# Patient Record
Sex: MALE | Race: WHITE | Employment: FULL TIME | URBAN - METROPOLITAN AREA
[De-identification: names, ages, dates, MRNs, and addresses within clinical notes are randomized per-mention and may not be internally consistent; named-entity substitution may affect disease eponyms.]

---

## 2019-07-18 ENCOUNTER — HOSPITAL ENCOUNTER (OUTPATIENT)
Facility: CLINIC | Age: 44
Setting detail: OBSERVATION
Discharge: HOME | End: 2019-07-19
Attending: EMERGENCY MEDICINE | Admitting: INTERNAL MEDICINE
Payer: COMMERCIAL

## 2019-07-18 ENCOUNTER — APPOINTMENT (OUTPATIENT)
Dept: CT IMAGING | Facility: CLINIC | Age: 44
End: 2019-07-18
Attending: EMERGENCY MEDICINE
Payer: COMMERCIAL

## 2019-07-18 DIAGNOSIS — N20.0 KIDNEY STONE: Primary | ICD-10-CM

## 2019-07-18 DIAGNOSIS — E87.6 HYPOKALEMIA: ICD-10-CM

## 2019-07-18 LAB
ALBUMIN SERPL-MCNC: 3.9 G/DL (ref 3.4–5)
ALBUMIN UR-MCNC: NEGATIVE MG/DL
ALP SERPL-CCNC: 100 U/L (ref 40–150)
ALT SERPL W P-5'-P-CCNC: 154 U/L (ref 0–70)
ANION GAP SERPL CALCULATED.3IONS-SCNC: 9 MMOL/L (ref 3–14)
APPEARANCE UR: CLEAR
AST SERPL W P-5'-P-CCNC: 61 U/L (ref 0–45)
BASOPHILS # BLD AUTO: 0.1 10E9/L (ref 0–0.2)
BASOPHILS NFR BLD AUTO: 0.8 %
BILIRUB DIRECT SERPL-MCNC: 0.2 MG/DL (ref 0–0.2)
BILIRUB SERPL-MCNC: 0.5 MG/DL (ref 0.2–1.3)
BILIRUB UR QL STRIP: NEGATIVE
BUN SERPL-MCNC: 16 MG/DL (ref 7–30)
CALCIUM SERPL-MCNC: 9.3 MG/DL (ref 8.5–10.1)
CHLORIDE SERPL-SCNC: 104 MMOL/L (ref 94–109)
CO2 SERPL-SCNC: 26 MMOL/L (ref 20–32)
COLOR UR AUTO: ABNORMAL
CREAT SERPL-MCNC: 1.14 MG/DL (ref 0.66–1.25)
DIFFERENTIAL METHOD BLD: ABNORMAL
EOSINOPHIL # BLD AUTO: 0.1 10E9/L (ref 0–0.7)
EOSINOPHIL NFR BLD AUTO: 0.8 %
ERYTHROCYTE [DISTWIDTH] IN BLOOD BY AUTOMATED COUNT: 11.6 % (ref 10–15)
GFR SERPL CREATININE-BSD FRML MDRD: 78 ML/MIN/{1.73_M2}
GLUCOSE SERPL-MCNC: 190 MG/DL (ref 70–99)
GLUCOSE UR STRIP-MCNC: 100 MG/DL
HBA1C MFR BLD: 6.1 % (ref 0–5.6)
HCT VFR BLD AUTO: 45.7 % (ref 40–53)
HGB BLD-MCNC: 16.5 G/DL (ref 13.3–17.7)
HGB UR QL STRIP: NEGATIVE
IMM GRANULOCYTES # BLD: 0 10E9/L (ref 0–0.4)
IMM GRANULOCYTES NFR BLD: 0.5 %
INTERPRETATION ECG - MUSE: NORMAL
KETONES UR STRIP-MCNC: ABNORMAL MG/DL
LEUKOCYTE ESTERASE UR QL STRIP: NEGATIVE
LIPASE SERPL-CCNC: 155 U/L (ref 73–393)
LYMPHOCYTES # BLD AUTO: 1.9 10E9/L (ref 0.8–5.3)
LYMPHOCYTES NFR BLD AUTO: 23.9 %
MAGNESIUM SERPL-MCNC: 2 MG/DL (ref 1.6–2.3)
MCH RBC QN AUTO: 33.8 PG (ref 26.5–33)
MCHC RBC AUTO-ENTMCNC: 36.1 G/DL (ref 31.5–36.5)
MCV RBC AUTO: 94 FL (ref 78–100)
MONOCYTES # BLD AUTO: 0.6 10E9/L (ref 0–1.3)
MONOCYTES NFR BLD AUTO: 7.1 %
MUCOUS THREADS #/AREA URNS LPF: PRESENT /LPF
NEUTROPHILS # BLD AUTO: 5.2 10E9/L (ref 1.6–8.3)
NEUTROPHILS NFR BLD AUTO: 66.9 %
NITRATE UR QL: NEGATIVE
NRBC # BLD AUTO: 0 10*3/UL
NRBC BLD AUTO-RTO: 0 /100
PH UR STRIP: 8 PH (ref 5–7)
PLATELET # BLD AUTO: 231 10E9/L (ref 150–450)
POTASSIUM SERPL-SCNC: 2.5 MMOL/L (ref 3.4–5.3)
POTASSIUM SERPL-SCNC: 4.2 MMOL/L (ref 3.4–5.3)
PROT SERPL-MCNC: 6.9 G/DL (ref 6.8–8.8)
RBC # BLD AUTO: 4.88 10E12/L (ref 4.4–5.9)
RBC #/AREA URNS AUTO: 5 /HPF (ref 0–2)
SODIUM SERPL-SCNC: 139 MMOL/L (ref 133–144)
SOURCE: ABNORMAL
SP GR UR STRIP: 1.01 (ref 1–1.03)
UROBILINOGEN UR STRIP-MCNC: NORMAL MG/DL (ref 0–2)
WBC # BLD AUTO: 7.7 10E9/L (ref 4–11)
WBC #/AREA URNS AUTO: <1 /HPF (ref 0–5)

## 2019-07-18 PROCEDURE — 83690 ASSAY OF LIPASE: CPT | Performed by: PHYSICIAN ASSISTANT

## 2019-07-18 PROCEDURE — 83036 HEMOGLOBIN GLYCOSYLATED A1C: CPT | Performed by: PHYSICIAN ASSISTANT

## 2019-07-18 PROCEDURE — 85025 COMPLETE CBC W/AUTO DIFF WBC: CPT | Performed by: EMERGENCY MEDICINE

## 2019-07-18 PROCEDURE — 99220 ZZC INITIAL OBSERVATION CARE,LEVL III: CPT | Performed by: PHYSICIAN ASSISTANT

## 2019-07-18 PROCEDURE — G0378 HOSPITAL OBSERVATION PER HR: HCPCS

## 2019-07-18 PROCEDURE — 25000132 ZZH RX MED GY IP 250 OP 250 PS 637: Performed by: EMERGENCY MEDICINE

## 2019-07-18 PROCEDURE — 80048 BASIC METABOLIC PNL TOTAL CA: CPT | Performed by: EMERGENCY MEDICINE

## 2019-07-18 PROCEDURE — 74176 CT ABD & PELVIS W/O CONTRAST: CPT

## 2019-07-18 PROCEDURE — 96375 TX/PRO/DX INJ NEW DRUG ADDON: CPT

## 2019-07-18 PROCEDURE — 81001 URINALYSIS AUTO W/SCOPE: CPT | Performed by: EMERGENCY MEDICINE

## 2019-07-18 PROCEDURE — 96361 HYDRATE IV INFUSION ADD-ON: CPT

## 2019-07-18 PROCEDURE — 80076 HEPATIC FUNCTION PANEL: CPT | Performed by: PHYSICIAN ASSISTANT

## 2019-07-18 PROCEDURE — 96376 TX/PRO/DX INJ SAME DRUG ADON: CPT

## 2019-07-18 PROCEDURE — 36415 COLL VENOUS BLD VENIPUNCTURE: CPT | Performed by: PHYSICIAN ASSISTANT

## 2019-07-18 PROCEDURE — 99285 EMERGENCY DEPT VISIT HI MDM: CPT | Mod: 25

## 2019-07-18 PROCEDURE — 96365 THER/PROPH/DIAG IV INF INIT: CPT

## 2019-07-18 PROCEDURE — 83735 ASSAY OF MAGNESIUM: CPT | Performed by: EMERGENCY MEDICINE

## 2019-07-18 PROCEDURE — 25000132 ZZH RX MED GY IP 250 OP 250 PS 637: Performed by: PHYSICIAN ASSISTANT

## 2019-07-18 PROCEDURE — 25000128 H RX IP 250 OP 636: Performed by: PHYSICIAN ASSISTANT

## 2019-07-18 PROCEDURE — 25000128 H RX IP 250 OP 636: Performed by: EMERGENCY MEDICINE

## 2019-07-18 PROCEDURE — 96366 THER/PROPH/DIAG IV INF ADDON: CPT

## 2019-07-18 PROCEDURE — 84132 ASSAY OF SERUM POTASSIUM: CPT | Performed by: PHYSICIAN ASSISTANT

## 2019-07-18 PROCEDURE — 25800030 ZZH RX IP 258 OP 636: Performed by: PHYSICIAN ASSISTANT

## 2019-07-18 RX ORDER — ACETAMINOPHEN 650 MG/1
650 SUPPOSITORY RECTAL EVERY 4 HOURS PRN
Status: DISCONTINUED | OUTPATIENT
Start: 2019-07-18 | End: 2019-07-19 | Stop reason: HOSPADM

## 2019-07-18 RX ORDER — CETIRIZINE HYDROCHLORIDE 10 MG/1
10 TABLET ORAL 2 TIMES DAILY
Status: DISCONTINUED | OUTPATIENT
Start: 2019-07-18 | End: 2019-07-19 | Stop reason: HOSPADM

## 2019-07-18 RX ORDER — ACETAMINOPHEN 325 MG/1
650 TABLET ORAL EVERY 4 HOURS PRN
Status: DISCONTINUED | OUTPATIENT
Start: 2019-07-18 | End: 2019-07-19 | Stop reason: HOSPADM

## 2019-07-18 RX ORDER — LEVOTHYROXINE SODIUM 100 UG/1
100 TABLET ORAL DAILY
Status: DISCONTINUED | OUTPATIENT
Start: 2019-07-18 | End: 2019-07-19 | Stop reason: HOSPADM

## 2019-07-18 RX ORDER — POTASSIUM CL/LIDO/0.9 % NACL 10MEQ/0.1L
10 INTRAVENOUS SOLUTION, PIGGYBACK (ML) INTRAVENOUS
Status: DISCONTINUED | OUTPATIENT
Start: 2019-07-18 | End: 2019-07-19 | Stop reason: HOSPADM

## 2019-07-18 RX ORDER — HYDROMORPHONE HYDROCHLORIDE 1 MG/ML
0.5 INJECTION, SOLUTION INTRAMUSCULAR; INTRAVENOUS; SUBCUTANEOUS
Status: DISCONTINUED | OUTPATIENT
Start: 2019-07-18 | End: 2019-07-18

## 2019-07-18 RX ORDER — MAGNESIUM SULFATE HEPTAHYDRATE 40 MG/ML
4 INJECTION, SOLUTION INTRAVENOUS EVERY 4 HOURS PRN
Status: DISCONTINUED | OUTPATIENT
Start: 2019-07-18 | End: 2019-07-19 | Stop reason: HOSPADM

## 2019-07-18 RX ORDER — FLUTICASONE PROPIONATE 50 MCG
2 SPRAY, SUSPENSION (ML) NASAL DAILY PRN
COMMUNITY

## 2019-07-18 RX ORDER — POTASSIUM CHLORIDE 1500 MG/1
20-40 TABLET, EXTENDED RELEASE ORAL
Status: DISCONTINUED | OUTPATIENT
Start: 2019-07-18 | End: 2019-07-19 | Stop reason: HOSPADM

## 2019-07-18 RX ORDER — POTASSIUM CHLORIDE 1.5 G/1.58G
20 POWDER, FOR SOLUTION ORAL ONCE
Status: COMPLETED | OUTPATIENT
Start: 2019-07-18 | End: 2019-07-18

## 2019-07-18 RX ORDER — KETOROLAC TROMETHAMINE 15 MG/ML
15 INJECTION, SOLUTION INTRAMUSCULAR; INTRAVENOUS ONCE
Status: COMPLETED | OUTPATIENT
Start: 2019-07-18 | End: 2019-07-18

## 2019-07-18 RX ORDER — PROCHLORPERAZINE MALEATE 10 MG
10 TABLET ORAL EVERY 6 HOURS PRN
Status: DISCONTINUED | OUTPATIENT
Start: 2019-07-18 | End: 2019-07-19 | Stop reason: HOSPADM

## 2019-07-18 RX ORDER — MORPHINE SULFATE 2 MG/ML
2 INJECTION, SOLUTION INTRAMUSCULAR; INTRAVENOUS ONCE
Status: COMPLETED | OUTPATIENT
Start: 2019-07-18 | End: 2019-07-18

## 2019-07-18 RX ORDER — POTASSIUM CL/LIDO/0.9 % NACL 10MEQ/0.1L
10 INTRAVENOUS SOLUTION, PIGGYBACK (ML) INTRAVENOUS
Status: DISCONTINUED | OUTPATIENT
Start: 2019-07-18 | End: 2019-07-18

## 2019-07-18 RX ORDER — MORPHINE SULFATE 4 MG/ML
4 INJECTION, SOLUTION INTRAMUSCULAR; INTRAVENOUS ONCE
Status: DISCONTINUED | OUTPATIENT
Start: 2019-07-18 | End: 2019-07-18

## 2019-07-18 RX ORDER — POTASSIUM CHLORIDE 29.8 MG/ML
20 INJECTION INTRAVENOUS
Status: DISCONTINUED | OUTPATIENT
Start: 2019-07-18 | End: 2019-07-19 | Stop reason: HOSPADM

## 2019-07-18 RX ORDER — MORPHINE SULFATE 4 MG/ML
2 INJECTION, SOLUTION INTRAMUSCULAR; INTRAVENOUS ONCE
Status: COMPLETED | OUTPATIENT
Start: 2019-07-18 | End: 2019-07-18

## 2019-07-18 RX ORDER — POTASSIUM CHLORIDE 7.45 MG/ML
10 INJECTION INTRAVENOUS
Status: DISCONTINUED | OUTPATIENT
Start: 2019-07-18 | End: 2019-07-19 | Stop reason: HOSPADM

## 2019-07-18 RX ORDER — PROCHLORPERAZINE 25 MG
25 SUPPOSITORY, RECTAL RECTAL EVERY 12 HOURS PRN
Status: DISCONTINUED | OUTPATIENT
Start: 2019-07-18 | End: 2019-07-19 | Stop reason: HOSPADM

## 2019-07-18 RX ORDER — OXYCODONE HYDROCHLORIDE 5 MG/1
5-10 TABLET ORAL
Status: DISCONTINUED | OUTPATIENT
Start: 2019-07-18 | End: 2019-07-19 | Stop reason: HOSPADM

## 2019-07-18 RX ORDER — MORPHINE SULFATE 2 MG/ML
2-4 INJECTION, SOLUTION INTRAMUSCULAR; INTRAVENOUS
Status: DISCONTINUED | OUTPATIENT
Start: 2019-07-18 | End: 2019-07-19 | Stop reason: HOSPADM

## 2019-07-18 RX ORDER — TAMSULOSIN HYDROCHLORIDE 0.4 MG/1
0.4 CAPSULE ORAL DAILY
Status: DISCONTINUED | OUTPATIENT
Start: 2019-07-18 | End: 2019-07-19 | Stop reason: HOSPADM

## 2019-07-18 RX ORDER — HYDROMORPHONE HYDROCHLORIDE 1 MG/ML
0.3 INJECTION, SOLUTION INTRAMUSCULAR; INTRAVENOUS; SUBCUTANEOUS
Status: DISCONTINUED | OUTPATIENT
Start: 2019-07-18 | End: 2019-07-18

## 2019-07-18 RX ORDER — AZELASTINE HYDROCHLORIDE 137 UG/1
1 SPRAY, METERED NASAL 2 TIMES DAILY
COMMUNITY

## 2019-07-18 RX ORDER — ONDANSETRON 2 MG/ML
4 INJECTION INTRAMUSCULAR; INTRAVENOUS EVERY 6 HOURS PRN
Status: DISCONTINUED | OUTPATIENT
Start: 2019-07-18 | End: 2019-07-19 | Stop reason: HOSPADM

## 2019-07-18 RX ORDER — AMOXICILLIN 250 MG
2 CAPSULE ORAL 2 TIMES DAILY PRN
Status: DISCONTINUED | OUTPATIENT
Start: 2019-07-18 | End: 2019-07-19 | Stop reason: HOSPADM

## 2019-07-18 RX ORDER — SODIUM CHLORIDE 9 MG/ML
1000 INJECTION, SOLUTION INTRAVENOUS CONTINUOUS
Status: DISCONTINUED | OUTPATIENT
Start: 2019-07-18 | End: 2019-07-18

## 2019-07-18 RX ORDER — DEXTROAMPHETAMINE SACCHARATE, AMPHETAMINE ASPARTATE, DEXTROAMPHETAMINE SULFATE AND AMPHETAMINE SULFATE 2.5; 2.5; 2.5; 2.5 MG/1; MG/1; MG/1; MG/1
20 TABLET ORAL 2 TIMES DAILY
Status: DISCONTINUED | OUTPATIENT
Start: 2019-07-18 | End: 2019-07-19 | Stop reason: HOSPADM

## 2019-07-18 RX ORDER — POTASSIUM CHLORIDE 1.5 G/1.58G
20-40 POWDER, FOR SOLUTION ORAL
Status: DISCONTINUED | OUTPATIENT
Start: 2019-07-18 | End: 2019-07-19 | Stop reason: HOSPADM

## 2019-07-18 RX ORDER — LEVOTHYROXINE SODIUM 100 UG/1
100 TABLET ORAL DAILY
COMMUNITY

## 2019-07-18 RX ORDER — AMOXICILLIN 250 MG
1 CAPSULE ORAL 2 TIMES DAILY PRN
Status: DISCONTINUED | OUTPATIENT
Start: 2019-07-18 | End: 2019-07-19 | Stop reason: HOSPADM

## 2019-07-18 RX ORDER — DEXTROAMPHETAMINE SACCHARATE, AMPHETAMINE ASPARTATE, DEXTROAMPHETAMINE SULFATE AND AMPHETAMINE SULFATE 5; 5; 5; 5 MG/1; MG/1; MG/1; MG/1
20 TABLET ORAL 2 TIMES DAILY
COMMUNITY

## 2019-07-18 RX ORDER — LIDOCAINE 40 MG/G
CREAM TOPICAL
Status: DISCONTINUED | OUTPATIENT
Start: 2019-07-18 | End: 2019-07-19 | Stop reason: HOSPADM

## 2019-07-18 RX ORDER — NALOXONE HYDROCHLORIDE 0.4 MG/ML
.1-.4 INJECTION, SOLUTION INTRAMUSCULAR; INTRAVENOUS; SUBCUTANEOUS
Status: DISCONTINUED | OUTPATIENT
Start: 2019-07-18 | End: 2019-07-19 | Stop reason: HOSPADM

## 2019-07-18 RX ORDER — SODIUM CHLORIDE, SODIUM LACTATE, POTASSIUM CHLORIDE, CALCIUM CHLORIDE 600; 310; 30; 20 MG/100ML; MG/100ML; MG/100ML; MG/100ML
INJECTION, SOLUTION INTRAVENOUS CONTINUOUS
Status: DISCONTINUED | OUTPATIENT
Start: 2019-07-18 | End: 2019-07-19 | Stop reason: HOSPADM

## 2019-07-18 RX ORDER — ONDANSETRON 2 MG/ML
4 INJECTION INTRAMUSCULAR; INTRAVENOUS EVERY 30 MIN PRN
Status: DISCONTINUED | OUTPATIENT
Start: 2019-07-18 | End: 2019-07-18

## 2019-07-18 RX ORDER — CETIRIZINE HYDROCHLORIDE 10 MG/1
10 TABLET ORAL 2 TIMES DAILY
COMMUNITY

## 2019-07-18 RX ORDER — POLYETHYLENE GLYCOL 3350 17 G/17G
17 POWDER, FOR SOLUTION ORAL DAILY PRN
Status: DISCONTINUED | OUTPATIENT
Start: 2019-07-18 | End: 2019-07-19 | Stop reason: HOSPADM

## 2019-07-18 RX ORDER — ONDANSETRON 4 MG/1
4 TABLET, ORALLY DISINTEGRATING ORAL EVERY 6 HOURS PRN
Status: DISCONTINUED | OUTPATIENT
Start: 2019-07-18 | End: 2019-07-19 | Stop reason: HOSPADM

## 2019-07-18 RX ORDER — DEXTROAMPHETAMINE SACCHARATE, AMPHETAMINE ASPARTATE, DEXTROAMPHETAMINE SULFATE AND AMPHETAMINE SULFATE 5; 5; 5; 5 MG/1; MG/1; MG/1; MG/1
20 TABLET ORAL 2 TIMES DAILY
Status: DISCONTINUED | OUTPATIENT
Start: 2019-07-18 | End: 2019-07-18

## 2019-07-18 RX ORDER — CALCIUM CARBONATE 500 MG/1
2 TABLET, CHEWABLE ORAL AT BEDTIME
COMMUNITY

## 2019-07-18 RX ADMIN — MORPHINE SULFATE 2 MG: 4 INJECTION INTRAVENOUS at 13:03

## 2019-07-18 RX ADMIN — LEVOTHYROXINE SODIUM 100 MCG: 100 TABLET ORAL at 14:49

## 2019-07-18 RX ADMIN — ONDANSETRON HYDROCHLORIDE 4 MG: 2 INJECTION, SOLUTION INTRAMUSCULAR; INTRAVENOUS at 10:57

## 2019-07-18 RX ADMIN — Medication 10 MEQ: at 09:02

## 2019-07-18 RX ADMIN — MORPHINE SULFATE 2 MG: 2 INJECTION, SOLUTION INTRAMUSCULAR; INTRAVENOUS at 10:56

## 2019-07-18 RX ADMIN — KETOROLAC TROMETHAMINE 15 MG: 15 INJECTION, SOLUTION INTRAMUSCULAR; INTRAVENOUS at 09:10

## 2019-07-18 RX ADMIN — HYDROMORPHONE HYDROCHLORIDE 0.5 MG: 1 INJECTION, SOLUTION INTRAMUSCULAR; INTRAVENOUS; SUBCUTANEOUS at 07:23

## 2019-07-18 RX ADMIN — POTASSIUM CHLORIDE 20 MEQ: 1.5 POWDER, FOR SOLUTION ORAL at 09:10

## 2019-07-18 RX ADMIN — ONDANSETRON HYDROCHLORIDE 4 MG: 2 INJECTION, SOLUTION INTRAMUSCULAR; INTRAVENOUS at 07:24

## 2019-07-18 RX ADMIN — MORPHINE SULFATE 2 MG: 2 INJECTION, SOLUTION INTRAMUSCULAR; INTRAVENOUS at 17:48

## 2019-07-18 RX ADMIN — MORPHINE SULFATE 2 MG: 2 INJECTION, SOLUTION INTRAMUSCULAR; INTRAVENOUS at 20:02

## 2019-07-18 RX ADMIN — CETIRIZINE HYDROCHLORIDE 10 MG: 10 TABLET, FILM COATED ORAL at 20:02

## 2019-07-18 RX ADMIN — OMEPRAZOLE 20 MG: 20 CAPSULE, DELAYED RELEASE ORAL at 14:49

## 2019-07-18 RX ADMIN — SODIUM CHLORIDE, POTASSIUM CHLORIDE, SODIUM LACTATE AND CALCIUM CHLORIDE: 600; 310; 30; 20 INJECTION, SOLUTION INTRAVENOUS at 14:18

## 2019-07-18 RX ADMIN — MORPHINE SULFATE 2 MG: 4 INJECTION INTRAVENOUS at 13:32

## 2019-07-18 RX ADMIN — PROCHLORPERAZINE EDISYLATE 10 MG: 5 INJECTION INTRAMUSCULAR; INTRAVENOUS at 14:18

## 2019-07-18 RX ADMIN — TAMSULOSIN HYDROCHLORIDE 0.4 MG: 0.4 CAPSULE ORAL at 14:49

## 2019-07-18 RX ADMIN — SODIUM CHLORIDE 1000 ML: 9 INJECTION, SOLUTION INTRAVENOUS at 07:27

## 2019-07-18 ASSESSMENT — ENCOUNTER SYMPTOMS
FLANK PAIN: 1
NAUSEA: 1
FREQUENCY: 1
VOMITING: 1

## 2019-07-18 NOTE — PHARMACY-ADMISSION MEDICATION HISTORY
"Admission medication history interview status for this patient is complete. See [facility] admission navigator for allergy information, prior to admission medications and immunization status.     Medication history interview source(s):Patient  Medication history resources (including written lists, pill bottles, clinic record): care everywhere record  Primary pharmacy: [facility] in [facility]    Changes made to PTA medication list:  Added: zyrtec, flonase, tums, omeprazole, adderall, azelastine, levothyroxine   Deleted: claritin, \"blood pressure medication\"  Changed: none    Actions taken by pharmacist (provider contacted, etc):None     Additional medication history information:None    Medication reconciliation/reorder completed by provider prior to medication history? No    Do you take OTC medications (eg tylenol, ibuprofen, fish oil, eye/ear drops, etc)? Y (Y/N)    For patients on insulin therapy: N (Y/N)    Prior to Admission medications    Medication Sig Last Dose Taking? Auth Provider   amphetamine-dextroamphetamine (ADDERALL) 20 MG tablet Take 20 mg by mouth 2 times daily 7/17/2019 at am Yes Unknown, Entered By History   Azelastine HCl 137 MCG/SPRAY SOLN Spray 1 spray into both nostrils 2 times daily 7/17/2019 at pm Yes Unknown, Entered By History   calcium carbonate (TUMS) 500 MG chewable tablet Take 2 chew tab by mouth At Bedtime 7/17/2019 at pm Yes Unknown, Entered By History   cetirizine (ZYRTEC) 10 MG tablet Take 10 mg by mouth 2 times daily 7/17/2019 at pm Yes Unknown, Entered By History   fluticasone (FLONASE) 50 MCG/ACT nasal spray Spray 2 sprays into both nostrils daily as needed for rhinitis or allergies 7/17/2019 at am Yes Unknown, Entered By History   levothyroxine (SYNTHROID/LEVOTHROID) 100 MCG tablet Take 100 mcg by mouth daily 7/17/2019 at Unknown time Yes Unknown, Entered By History   omeprazole (PRILOSEC) 20 MG DR capsule Take 20 mg by mouth daily 7/17/2019 at am Yes Unknown, Entered By History "

## 2019-07-18 NOTE — ED NOTES
Patient oxygen saturations dropped to 40's after administration of Dilaudid. Oxygen applied via nasal cannula at 5 litters. Oxygen saturations angi to mid 90's. Patient very sleepy but will answer questions and fall back asleep. MD made aware and went to bedside. No further orders, will continue to monitor.

## 2019-07-18 NOTE — ED PROVIDER NOTES
History     Chief Complaint:  Flank Pain      HPI   Raj Cheema is a 43 year old male with a history of hypertension who presents to the ED for evaluation of flank pain. The patient reports that he was woken from sleep this morning at 0400 by the sudden onset of left flank pain radiating to his testicles. This was associated with nausea, vomiting, and urinary frequency. His wife decided to drive him to the ED secondary to the severity of his discomfort, and here he endorses continued pain. The patient denies any personal history of kidney stones, although he notes that his daughter recently had one removed.     Allergies:  NKDA    Medications:    Claritin  Azelastine  Adderall  Levothyroxine  Omeprazole  Flonase    Past Medical History:    Arthritis  CORRINE  Wright's esophagus  ADHD  Hypothyroidism  Allergic rhinitis  Hypertension   GI problem    Past Surgical History:    Knee meniscus repair  Knee arthrocentesis    Family History:    Diabetes  Allergies  Hypertension  Hyperlipidemia  Thyroid disease  Eye disorder    Social History:  Negative for tobacco use.  Alcohol Use: Yes   Marital Status:   [2]     Review of Systems   Gastrointestinal: Positive for nausea and vomiting.   Genitourinary: Positive for flank pain and frequency.   All other systems reviewed and are negative.    Physical Exam     Patient Vitals for the past 24 hrs:   BP Temp Pulse Heart Rate Resp SpO2   07/18/19 1300 -- -- 82 -- -- --   07/18/19 1245 -- -- -- -- -- 99 %   07/18/19 1230 (!) 152/108 -- 67 -- -- 99 %   07/18/19 1215 (!) 146/99 -- 69 -- -- 97 %   07/18/19 1200 (!) 135/96 -- 76 -- -- 94 %   07/18/19 1145 (!) 149/98 -- 71 -- -- 96 %   07/18/19 1130 (!) 143/98 -- 73 -- -- 95 %   07/18/19 1120 (!) 144/97 -- -- -- -- 96 %   07/18/19 1115 (!) 144/97 -- 73 -- -- 95 %   07/18/19 1100 132/82 -- 76 -- -- 98 %   07/18/19 1015 (!) 144/95 -- 69 -- -- 98 %   07/18/19 1000 (!) 142/101 -- 74 -- -- 98 %   07/18/19 0945 (!) 147/95 -- 68 -- --  98 %   07/18/19 0930 147/90 -- 68 -- -- 97 %   07/18/19 0915 (!) 146/96 -- 69 -- -- 98 %   07/18/19 0900 (!) 138/94 -- 67 -- -- --   07/18/19 0815 -- -- -- -- -- 99 %   07/18/19 0800 (!) 134/98 -- 74 -- -- 100 %   07/18/19 0759 -- 98.8  F (37.1  C) -- -- -- --   07/18/19 0745 (!) 145/96 -- 64 -- -- 100 %   07/18/19 0731 -- -- -- -- -- (!) 48 %   07/18/19 0730 138/89 -- -- -- -- (!) 80 %   07/18/19 0707 115/81 -- 87 87 18 99 %        Physical Exam  General: Uncomfortable appearing, moaning   Head:  The scalp, face, and head appear normal  Eyes:  Conjunctivae are normal  ENT:    The nose is normal    Pinnae are normal    External acoustic canals are normal  Neck:  Trachea midline  CV:  Pulses are normal.    Resp:  No respiratory distress   Abdomen:      Soft, left flank and left anterior abdominal tenderness, non-distended  Musc:  Normal muscular tone    No major joint effusions    No asymmetric leg swelling  Skin:  No rash or lesions noted  Neuro:  Speech is normal and fluent. Face is symmetric.     Moving all extremities well.   Psych: Awake. Alert.  Normal affect.  Appropriate interactions.    Emergency Department Course   ECG:  Indication: Hypokalemia  Time: 0759  Vent. Rate 66 bpm. AZ interval 138. QRS duration 92. QT/QTc 440/461. P-R-T axis 26 34 135.  Normal sinus rhythm. T wave abnormality, consider lateral ischemia. Prolonged QT. Abnormal ECG. Read time: 0759     Imaging:  Radiographic findings were communicated with the patient who voiced understanding of the findings.   CT Abdomen/Pelvis w/o IV contrast-stone protocol:   1. Left hydronephrosis due to 0.2 cm distal left ureter stone at the  level of the acetabulum approximately 1.5 cm from the ureterovesical  junction.  2. Tiny intrarenal nonobstructing calculi, 0.3 cm or less.  3. fatty liver.  4. There is lobulation of the contour of the pancreas, anterior  fullness at the proximal pancreatic tail of uncertain significance. If  the patient has any outside  prior exam, comparison is recommended to  see whether this is stable. If no comparison exam to document  stability, consider follow-up or further evaluation with dedicated  pancreas protocol CT with IV contrast, as per radiology.      Laboratory:  CBC: WBC: 7.7, HGB: 16.5, PLT: 231  BMP: Glucose 190 (H), K 2.5 (LL), o/w WNL (Creatinine: 1.14)    Magnesium: 2.0    UA with Microscopic:  (A), Ketones trace (A), pH 8.0 (H), RBC 5 (H), Mucous present (A), o/w WNL     Interventions:  0723 Dilaudid, 0.5 mg, IV injection   0724 Zofran, 4 mg, IV injection  0727 NS 1L IV   0902 Potassium Chloride 10 mEq IV infusion  0910 Klor-Con 20 mEq PO   Toradol 15 mg IV  1056 Morphine 2 mg IV  1057 Zofran, 4 mg, IV injection   1303 Morphine 2 mg IV  Please see MAR for full list of medications administered in the ED.      Emergency Department Course:  Nursing notes and vitals reviewed. (9510) I performed an exam of the patient as documented above.     IV inserted. Medicine administered as documented above. Blood drawn. This was sent to the lab for further testing, results above.    (0400) Nursing staff informed me that the patient's SPO2 had dropped to 40% following administration of 0.5 mg Dilaudid. He was rousable to sternal rub and placed on supplemental O2. Patient's wife confirmed that he does have a history of sleep apnea.    (0341) I was informed that the patient's potassium was critically low at 2.5.     EKG obtained in the ED, see results above.      The patient was sent for an abdominal CT while in the emergency department, findings above.     The patient provided a urine sample here in the emergency department. This was sent for laboratory testing, findings above.     (1020) I rechecked the patient and discussed the results of his workup thus far.     (1112)  I consulted with Bee Kim PA-C of the hospitalist services. They are in agreement to accept the patient for admission to Dr. Dimas.    Findings and plan  explained to the Patient who consents to admission. Discussed the patient with Bee Kim for Dr. Dimas, who will admit the patient to an observation bed for further monitoring, evaluation, and treatment.    Impression & Plan    Medical Decision Making:  Raj Cheema is a 43 year old male who presents with abdominal pain. When I arrived to the room he was quite uncomfortable and moaning in pain. He admitted to several episodes of vomiting and I was suspicious for kidney stone. Urinalysis was unremarkable. Blood work and pain medication, as well as Zofran, were given. He had a mildly prolonged QT of 460, however this really did not appear too prolonged. Interestingly, his potassium was 2.5, likely secondary to his vomiting. Patient was given IV and oral potassium once his nausea was under control. CT revealed a small stone in the distal left ureter. It is only 0.2 cm so likely will pass on its own. Patient was given IV fluids. With his significant hypokalemia and continued pain, I discussed admission and patient was agreeable to this. I doubt he will need stent placement given the small size of the stone. Patient admitted to observation in stable condition.     Diagnosis:    ICD-10-CM    1. Hypokalemia E87.6        Disposition:  Admitted to Dr. Dimas      Scribe Disclosure:  I, Ann Vick, am serving as a scribe on 7/18/2019 at 7:10 AM to personally document services performed by Inga Low MD based on my observations and the provider's statements to me.      Ann Vick  7/18/2019   [facility] EMERGENCY DEPARTMENT       Inga Low MD  07/18/19 8074

## 2019-07-18 NOTE — ED NOTES
[facility]  ED Nurse Handoff Report    Raj Cheema is a 43 year old male   ED Chief complaint: Flank Pain  . ED Diagnosis:   Final diagnoses:   Hypokalemia     Allergies: No Known Allergies     Code Status: Not on file  Activity level - Baseline/Home:  Independent. Activity Level - Current:   Stand by Assist. Lift room needed: No. Bariatric: No   Needed: No   Isolation: No. Infection: Not Applicable.     Vital Signs:   Vitals:    07/18/19 1215 07/18/19 1230 07/18/19 1245 07/18/19 1300   BP: (!) 146/99 (!) 152/108     Pulse: 69 67  82   Resp:       Temp:       SpO2: 97% 99% 99%        Cardiac Rhythm:  ,      Pain level: 0-10 Pain Scale: 5  Patient confused: No. Patient Falls Risk: Yes.   Elimination Status: Has voided   Patient Report - Initial Complaint: Flank and groin pain. Focused Assessment:Raj Cheema is a 43 year old male with a history of hypertension who presents to the ED for evaluation of flank pain. The patient reports that he was woken from sleep this morning at 0400 by the sudden onset of left flank pain radiating to his testicles. This was associated with nausea, vomiting, and urinary frequency. His wife decided to drive him to the ED secondary to the severity of his discomfort, and here he endorses continued pain. The patient denies any personal history of kidney stones, although he notes that his daughter recently had one removed.      Genitourinary Genitourinary - Genitourinary WDL: -WDL except; general symptoms   Genitourinary -  Signs and Symptoms: flank pain (left sided pain, pain goes down into groin area. Denies any blood in urine, patient states he his having urine frequency)        Tests Performed: Blood Work, CT abdomen . Abnormal Results:   Labs Ordered and Resulted from Time of ED Arrival Up to the Time of Departure from the ED   CBC WITH PLATELETS DIFFERENTIAL - Abnormal; Notable for the following components:       Result Value    MCH 33.8 (*)     All  other components within normal limits   BASIC METABOLIC PANEL - Abnormal; Notable for the following components:    Potassium 2.5 (*)     Glucose 190 (*)     All other components within normal limits   ROUTINE UA WITH MICROSCOPIC - Abnormal; Notable for the following components:    Glucose Urine 100 (*)     Ketones Urine Trace (*)     pH Urine 8.0 (*)     RBC Urine 5 (*)     Mucous Urine Present (*)     All other components within normal limits   MAGNESIUM     Abd/pelvis CT no contrast - Stone Protocol   Preliminary Result   IMPRESSION:    1. Left hydronephrosis due to 0.2 cm distal left ureter stone at the   level of the acetabulum approximately 1.5 cm from the ureterovesical   junction.   2. Tiny intrarenal nonobstructing calculi, 0.3 cm or less.   3. fatty liver.   4. There is lobulation of the contour of the pancreas, anterior   fullness at the proximal pancreatic tail of uncertain significance. If   the patient has any outside prior exam, comparison is recommended to   see whether this is stable. If no comparison exam to document   stability, consider follow-up or further evaluation with dedicated   pancreas protocol CT with IV contrast.          Treatments provided:Pain medication   Family Comments: Wife in room  OBS brochure/video discussed/provided to patient:  Yes  ED Medications:   Medications   0.9% sodium chloride BOLUS (0 mLs Intravenous Stopped 7/18/19 0901)     Followed by   sodium chloride 0.9% infusion (has no administration in time range)   HYDROmorphone (PF) (DILAUDID) injection 0.5 mg (0.5 mg Intravenous Given 7/18/19 0723)   ondansetron (ZOFRAN) injection 4 mg (4 mg Intravenous Given 7/18/19 1057)   potassium chloride 10 mEq in 100 mL intermittent infusion with 10 mg lidocaine (0 mEq Intravenous Stopped 7/18/19 1127)   potassium chloride (KLOR-CON) Packet 20 mEq (20 mEq Oral Given 7/18/19 0910)   ketorolac (TORADOL) injection 15 mg (15 mg Intravenous Given 7/18/19 0910)   morphine (PF) injection  2 mg (2 mg Intravenous Given 7/18/19 1056)   morphine (PF) injection 2 mg (2 mg Intravenous Given 7/18/19 1303)     Drips infusing:  No  For the majority of the shift, the patient's behavior Green. Interventions performed were NA.     Severe Sepsis OR Septic Shock Diagnosis Present: No      ED Nurse Name/Phone Number: Marla Stanton,   10:45 AM  RECEIVING UNIT ED HANDOFF REVIEW    Above ED Nurse Handoff Report was reviewed: Yes  Reviewed by: iGna Finn on July 18, 2019 at 1:13 PM

## 2019-07-18 NOTE — PLAN OF CARE
PRIMARY DIAGNOSIS: ACUTE RENAL COLIC/hypokalemia    OUTPATIENT/OBSERVATION GOALS TO BE MET BEFORE DISCHARGE  1. Pain Status: Improved but still requiring IV narcotics.    2. Tolerating adequate PO diet: Nauseous, compazine given.      3. Surgical Intervention planned: No    4. Cleared by consultants (if involved): N/A    5. Return to near baseline physical activity: No, SBA for safety     Discharge Planner Nurse   Safe discharge environment identified: Yes  Barriers to discharge: No       Entered by: Gina Finn 07/18/2019 3:08 PM     Please review provider order for any additional goals.   Nurse to notify provider when observation goals have been met and patient is ready for discharge.    VSS except BP elevated.  IV compazine x1 for c/o nausea. LR infusing @ 150mlh.  SBA due to narcotics.  IV morphine available for pain. Straining urine, pt started on flomax.

## 2019-07-18 NOTE — ED TRIAGE NOTES
Patient presents with complaints of left sided flank pain which radiates into groin. Patient also having frequency and vomiting as well.  Pain started around 0400.  Denies any history of kidney stones. ABC intact without need for intervention at this time.

## 2019-07-18 NOTE — PLAN OF CARE
ROOM # 230    Living Situation (if not independent, order SW consult):  Facility name:  : [facility]     Activity level at baseline: Independent   Activity level on admit: SBA      Patient registered to observation; given Patient Bill of Rights; given the opportunity to ask questions about observation status and their plan of care.  Patient has been oriented to the observation room, bathroom and call light is in place.    Discussed discharge goals and expectations with patient/family.

## 2019-07-18 NOTE — H&P
History and Physical     Raj Cheema MRN# [id number]   YOB: 1975 Age: 43 year old      Date of Admission:  7/18/2019    Primary care provider: Clinic, [facility]          Assessment and Plan:   Raj Cheema is a 43 year old male with a PMH significant for hypertension, hypothyroidism Wright's esophagus, ADHD and allergies who presents with intractable left lower abdominal pain    Patient was discussed with Dr. Low, who was provider in ED. Chart review of ED work up was reviewed as well as chart review of Care Everywhere, previous visits and admissions.     #Left-sided nephrolithiasis  Patient presents with left lower quadrant pain radiating to his groin and left CVA.  This is associated with chills, nausea and vomiting but no documented fever with WBC of 7.7.  UA does not appear infected and CT of the abdomen shows left hydronephrosis due to 0.2 cm distal left ureter stone at the level of the acetabulum.  Also seen is multiple small intrarenal nonobstructing calculi.  Given the size of the stone I anticipate it will pass on its own with conservative measures.  -High rate fluids with lactated Ringer's at 125 mL/h  -Oral Flomax  -We will have both oral and IV pain medication available  -Nausea medication available  -May have regular diet for now but n.p.o. after midnight in case urologic consult is needed  -Strain urine    #Hypokalemia  Patient has had frequent episodes of vomiting since this pain started which I believe is causing his hypokalemia.  He does not take any diuretics.  -Replace per protocol  -Recheck in a.m.    #Hypertension  Does not take any home medication    #ADHD  -Continue PTA Adderall    #Hypothyroidism  -Continue PTA levothyroxine    #Wright's esophagus  -Continue PTA omeprazole    #Allergies  -Continue PTA Zyrtec but did hold Flonase and Azelastine unless he brings in his own supply    #Incidental finding of pancreatic tail fullness  CT scan shows  lobulation of the contour of the pancreas with anterior fullness at the proximal pancreatic tail of uncertain significance.  Recommending comparison to other image versus follow-up with dedicated pancreas protocol CT with IV contrast.  Patient has no history of previous abdominal imaging and does not have any history of pancreas problems or family history of pancreatic cancer.  -Obtain lipase and LFT studies  -If lipase abnormal will obtain dedicated pancreas CT  -If labs are normal will recommend outpatient imaging and last morning provider feels differently  -Recommend referral to GI for possible EUS        Social: No concerns  Code: Discussed with patient and they have chosen Full code  VTE prophylaxis: Ambulation  Disposition: Observation                    Chief Complaint:   Left lower abdominal pain and vomiting         History of Present Illness:   Raj Cheema is a 43 year old male who presents with acute onset of intractable left lower quadrant abdominal pain associated with multiple episodes of vomiting and chills.  The pain started early this morning and has been constant but improved after coming to the ED.  He may have seen some blood in his urine earlier.  He denies diarrhea, constipation, dysuria, cough, shortness of breath, chest pain and documented fever.  He has no history of kidney stones but his daughter has had some.  On review of systems he complains of intermittent swelling in his hands arms and left foot.  He does not drink alcohol or smoke cigarettes daily.  He has not started any new medications recently.             Past Medical History:     Past Medical History:   Diagnosis Date     Arthritis      GI problem      Hypertension                Past Surgical History:     Past Surgical History:   Procedure Laterality Date      KNEE SCOPE,MED/LAT MENISCUS REPAIR  1997               Social History:     Social History     Socioeconomic History     Marital status:      Spouse name: Not  on file     Number of children: Not on file     Years of education: Not on file     Highest education level: Not on file   Occupational History     Not on file   Social Needs     Financial resource strain: Not on file     Food insecurity:     Worry: Not on file     Inability: Not on file     Transportation needs:     Medical: Not on file     Non-medical: Not on file   Tobacco Use     Smoking status: Never Smoker     Smokeless tobacco: Never Used   Substance and Sexual Activity     Alcohol use: Not on file     Drug use: Not on file     Sexual activity: Not on file   Lifestyle     Physical activity:     Days per week: Not on file     Minutes per session: Not on file     Stress: Not on file   Relationships     Social connections:     Talks on phone: Not on file     Gets together: Not on file     Attends Advent service: Not on file     Active member of club or organization: Not on file     Attends meetings of clubs or organizations: Not on file     Relationship status: Not on file     Intimate partner violence:     Fear of current or ex partner: Not on file     Emotionally abused: Not on file     Physically abused: Not on file     Forced sexual activity: Not on file   Other Topics Concern     Not on file   Social History Narrative     Not on file               Family History:     Family History   Problem Relation Age of Onset     Diabetes Mother      Allergies Mother      Thyroid Disease Mother      Eye Disorder Father             Allergies:    No Known Allergies            Medications:     Prior to Admission medications    Medication Sig Last Dose Taking? Auth Provider   amphetamine-dextroamphetamine (ADDERALL) 20 MG tablet Take 20 mg by mouth 2 times daily 7/17/2019 at am Yes Unknown, Entered By History   Azelastine HCl 137 MCG/SPRAY SOLN Spray 1 spray into both nostrils 2 times daily 7/17/2019 at pm Yes Unknown, Entered By History   calcium carbonate (TUMS) 500 MG chewable tablet Take 2 chew tab by mouth At  Bedtime 7/17/2019 at pm Yes Unknown, Entered By History   cetirizine (ZYRTEC) 10 MG tablet Take 10 mg by mouth 2 times daily 7/17/2019 at pm Yes Unknown, Entered By History   fluticasone (FLONASE) 50 MCG/ACT nasal spray Spray 2 sprays into both nostrils daily as needed for rhinitis or allergies 7/17/2019 at am Yes Unknown, Entered By History   levothyroxine (SYNTHROID/LEVOTHROID) 100 MCG tablet Take 100 mcg by mouth daily 7/17/2019 at Unknown time Yes Unknown, Entered By History   omeprazole (PRILOSEC) 20 MG DR capsule Take 20 mg by mouth daily 7/17/2019 at am Yes Unknown, Entered By History              Review of Systems:   A Comprehensive greater than 10 system review of systems was carried out.  Pertinent positives and negatives are noted above.  Otherwise negative for contributory information.            Physical Exam:   Blood pressure (!) 154/104, pulse 69, temperature 97.7  F (36.5  C), temperature source Oral, resp. rate 18, SpO2 99 %.  Exam:  GENERAL:  Comfortable.  PSYCH: pleasant, oriented, No acute distress.  HEENT:  PERRLA. Normal conjunctiva, normal hearing, nasal mucosa and Oropharynx are normal.  NECK:  Supple, no neck vein distention, adenopathy or bruits, normal thyroid.  HEART:  Normal S1, S2 with no murmur, no pericardial rub, gallops or S3 or S4.  LUNGS:  Clear to auscultation, normal Respiratory effort. No wheezing, rales or ronchi.  ABDOMEN:  Soft, no hepatosplenomegaly, normal bowel sounds. Mild tenderness left lower quadrant.  EXTREMITIES:  No pedal edema, +2 pulses bilateral and equal.  SKIN:  Dry to touch, No rash, wound or ulcerations.  NEUROLOGIC:  CN 2-12 grossly intact, sensation is intact with no focal deficits.               Data:     Recent Labs   Lab 07/18/19  0709   WBC 7.7   HGB 16.5   HCT 45.7   MCV 94        Recent Labs   Lab 07/18/19  0709      POTASSIUM 2.5*   CHLORIDE 104   CO2 26   ANIONGAP 9   *   BUN 16   CR 1.14   GFRESTIMATED 78   GFRESTBLACK >90    JUAN 9.3   MAG 2.0     Recent Labs   Lab 07/18/19  0902   COLOR Straw   APPEARANCE Clear   URINEGLC 100*   URINEBILI Negative   URINEKETONE Trace*   SG 1.010   UBLD Negative   URINEPH 8.0*   PROTEIN Negative   NITRITE Negative   LEUKEST Negative   RBCU 5*   WBCU <1         Recent Results (from the past 24 hour(s))   Abd/pelvis CT no contrast - Stone Protocol    Narrative    CT ABDOMEN AND PELVIS WITHOUT CONTRAST 7/18/2019 8:30 AM    TECHNIQUE: Images from diaphragm to pubic symphysis without contrast.  Radiation dose for this scan was reduced using automated exposure  control, adjustment of the mA and/or kV according to patient size, or  iterative reconstruction technique.    HISTORY: Left-sided pain.    COMPARISON: None.    FINDINGS:   Abdomen and Pelvis: 0.2 cm distal left ureter stone 1.5 cm from the  UVJ with mild left hydronephrosis and hydroureter ureter. Additional  tiny nonobstructing right intrarenal calculi 0.3 cm or less, equivocal  tiny left 0.1 cm stone, series 3 image 90. Lung base is clear. Diffuse  fatty infiltration of the liver. Within the limits of a noncontrast  CT, the spleen, gallbladder, and adrenal glands appear normal. There  is some lobulation along the anterior proximal pancreatic tail, series  2 image 28, pancreas otherwise unremarkable.      Impression    IMPRESSION:   1. Left hydronephrosis due to 0.2 cm distal left ureter stone at the  level of the acetabulum approximately 1.5 cm from the ureterovesical  junction.  2. Tiny intrarenal nonobstructing calculi, 0.3 cm or less.  3. fatty liver.  4. There is lobulation of the contour of the pancreas, anterior  fullness at the proximal pancreatic tail of uncertain significance. If  the patient has any outside prior exam, comparison is recommended to  see whether this is stable. If no comparison exam to document  stability, consider follow-up or further evaluation with dedicated  pancreas protocol CT with IV contrast.         Fior Kim  SEAN

## 2019-07-19 ENCOUNTER — APPOINTMENT (OUTPATIENT)
Dept: GENERAL RADIOLOGY | Facility: CLINIC | Age: 44
End: 2019-07-19
Attending: INTERNAL MEDICINE
Payer: COMMERCIAL

## 2019-07-19 ENCOUNTER — ANESTHESIA EVENT (OUTPATIENT)
Dept: SURGERY | Facility: CLINIC | Age: 44
End: 2019-07-19
Payer: COMMERCIAL

## 2019-07-19 ENCOUNTER — ANESTHESIA (OUTPATIENT)
Dept: SURGERY | Facility: CLINIC | Age: 44
End: 2019-07-19
Payer: COMMERCIAL

## 2019-07-19 VITALS
SYSTOLIC BLOOD PRESSURE: 114 MMHG | DIASTOLIC BLOOD PRESSURE: 74 MMHG | TEMPERATURE: 97.6 F | OXYGEN SATURATION: 92 % | RESPIRATION RATE: 15 BRPM | HEART RATE: 90 BPM

## 2019-07-19 LAB
ANION GAP SERPL CALCULATED.3IONS-SCNC: 5 MMOL/L (ref 3–14)
BUN SERPL-MCNC: 14 MG/DL (ref 7–30)
CALCIUM SERPL-MCNC: 8.6 MG/DL (ref 8.5–10.1)
CHLORIDE SERPL-SCNC: 105 MMOL/L (ref 94–109)
CO2 SERPL-SCNC: 28 MMOL/L (ref 20–32)
COPATH REPORT: NORMAL
CREAT SERPL-MCNC: 1.28 MG/DL (ref 0.66–1.25)
ERYTHROCYTE [DISTWIDTH] IN BLOOD BY AUTOMATED COUNT: 11.8 % (ref 10–15)
GFR SERPL CREATININE-BSD FRML MDRD: 68 ML/MIN/{1.73_M2}
GLUCOSE SERPL-MCNC: 133 MG/DL (ref 70–99)
HCT VFR BLD AUTO: 45.6 % (ref 40–53)
HGB BLD-MCNC: 15.7 G/DL (ref 13.3–17.7)
MCH RBC QN AUTO: 33.7 PG (ref 26.5–33)
MCHC RBC AUTO-ENTMCNC: 34.4 G/DL (ref 31.5–36.5)
MCV RBC AUTO: 98 FL (ref 78–100)
PHOSPHATE SERPL-MCNC: 3 MG/DL (ref 2.5–4.5)
PLATELET # BLD AUTO: 179 10E9/L (ref 150–450)
POTASSIUM SERPL-SCNC: 3.6 MMOL/L (ref 3.4–5.3)
RBC # BLD AUTO: 4.66 10E12/L (ref 4.4–5.9)
SODIUM SERPL-SCNC: 138 MMOL/L (ref 133–144)
WBC # BLD AUTO: 14.7 10E9/L (ref 4–11)

## 2019-07-19 PROCEDURE — 25000125 ZZHC RX 250: Performed by: NURSE ANESTHETIST, CERTIFIED REGISTERED

## 2019-07-19 PROCEDURE — 25000128 H RX IP 250 OP 636: Performed by: PHYSICIAN ASSISTANT

## 2019-07-19 PROCEDURE — 71000012 ZZH RECOVERY PHASE 1 LEVEL 1 FIRST HR: Performed by: UROLOGY

## 2019-07-19 PROCEDURE — 36000052 ZZH SURGERY LEVEL 2 EA 15 ADDTL MIN: Performed by: UROLOGY

## 2019-07-19 PROCEDURE — 25000125 ZZHC RX 250: Performed by: UROLOGY

## 2019-07-19 PROCEDURE — C2617 STENT, NON-COR, TEM W/O DEL: HCPCS | Performed by: UROLOGY

## 2019-07-19 PROCEDURE — G0378 HOSPITAL OBSERVATION PER HR: HCPCS

## 2019-07-19 PROCEDURE — 37000008 ZZH ANESTHESIA TECHNICAL FEE, 1ST 30 MIN: Performed by: UROLOGY

## 2019-07-19 PROCEDURE — 25000128 H RX IP 250 OP 636: Performed by: NURSE ANESTHETIST, CERTIFIED REGISTERED

## 2019-07-19 PROCEDURE — 25800030 ZZH RX IP 258 OP 636: Performed by: PHYSICIAN ASSISTANT

## 2019-07-19 PROCEDURE — 84100 ASSAY OF PHOSPHORUS: CPT | Performed by: PHYSICIAN ASSISTANT

## 2019-07-19 PROCEDURE — 52352 CYSTOURETERO W/STONE REMOVE: CPT | Mod: LT | Performed by: UROLOGY

## 2019-07-19 PROCEDURE — 88300 SURGICAL PATH GROSS: CPT | Mod: 26 | Performed by: UROLOGY

## 2019-07-19 PROCEDURE — 37000009 ZZH ANESTHESIA TECHNICAL FEE, EACH ADDTL 15 MIN: Performed by: UROLOGY

## 2019-07-19 PROCEDURE — 96376 TX/PRO/DX INJ SAME DRUG ADON: CPT | Mod: 59

## 2019-07-19 PROCEDURE — 96361 HYDRATE IV INFUSION ADD-ON: CPT | Mod: 59

## 2019-07-19 PROCEDURE — 40000277 XR SURGERY CARM FLUORO LESS THAN 5 MIN W STILLS: Mod: TC

## 2019-07-19 PROCEDURE — 82365 CALCULUS SPECTROSCOPY: CPT | Performed by: UROLOGY

## 2019-07-19 PROCEDURE — 74420 UROGRAPHY RTRGR +-KUB: CPT | Mod: 26 | Performed by: UROLOGY

## 2019-07-19 PROCEDURE — 36415 COLL VENOUS BLD VENIPUNCTURE: CPT | Performed by: PHYSICIAN ASSISTANT

## 2019-07-19 PROCEDURE — 52332 CYSTOSCOPY AND TREATMENT: CPT | Mod: LT | Performed by: UROLOGY

## 2019-07-19 PROCEDURE — 99217 ZZC OBSERVATION CARE DISCHARGE: CPT | Performed by: PHYSICIAN ASSISTANT

## 2019-07-19 PROCEDURE — 71000027 ZZH RECOVERY PHASE 2 EACH 15 MINS: Performed by: UROLOGY

## 2019-07-19 PROCEDURE — 25000128 H RX IP 250 OP 636: Performed by: UROLOGY

## 2019-07-19 PROCEDURE — 25800030 ZZH RX IP 258 OP 636: Performed by: ANESTHESIOLOGY

## 2019-07-19 PROCEDURE — 40000306 ZZH STATISTIC PRE PROC ASSESS II: Performed by: UROLOGY

## 2019-07-19 PROCEDURE — 36000050 ZZH SURGERY LEVEL 2 1ST 30 MIN: Performed by: UROLOGY

## 2019-07-19 PROCEDURE — 27210794 ZZH OR GENERAL SUPPLY STERILE: Performed by: UROLOGY

## 2019-07-19 PROCEDURE — 85027 COMPLETE CBC AUTOMATED: CPT | Performed by: PHYSICIAN ASSISTANT

## 2019-07-19 PROCEDURE — 80048 BASIC METABOLIC PNL TOTAL CA: CPT | Performed by: PHYSICIAN ASSISTANT

## 2019-07-19 PROCEDURE — 25000132 ZZH RX MED GY IP 250 OP 250 PS 637: Performed by: PHYSICIAN ASSISTANT

## 2019-07-19 PROCEDURE — 99243 OFF/OP CNSLTJ NEW/EST LOW 30: CPT | Mod: 57 | Performed by: UROLOGY

## 2019-07-19 PROCEDURE — 88300 SURGICAL PATH GROSS: CPT | Performed by: UROLOGY

## 2019-07-19 PROCEDURE — C1769 GUIDE WIRE: HCPCS | Performed by: UROLOGY

## 2019-07-19 DEVICE — STENT URETERAL POLARIS ULTRA 6FRX26CM M0061921330: Type: IMPLANTABLE DEVICE | Site: URETER | Status: FUNCTIONAL

## 2019-07-19 RX ORDER — MEPERIDINE HYDROCHLORIDE 50 MG/ML
12.5 INJECTION INTRAMUSCULAR; INTRAVENOUS; SUBCUTANEOUS EVERY 5 MIN PRN
Status: DISCONTINUED | OUTPATIENT
Start: 2019-07-19 | End: 2019-07-19 | Stop reason: HOSPADM

## 2019-07-19 RX ORDER — LIDOCAINE HYDROCHLORIDE 10 MG/ML
INJECTION, SOLUTION INFILTRATION; PERINEURAL PRN
Status: DISCONTINUED | OUTPATIENT
Start: 2019-07-19 | End: 2019-07-19

## 2019-07-19 RX ORDER — CEFAZOLIN SODIUM 2 G/100ML
2 INJECTION, SOLUTION INTRAVENOUS
Status: DISCONTINUED | OUTPATIENT
Start: 2019-07-19 | End: 2019-07-19 | Stop reason: HOSPADM

## 2019-07-19 RX ORDER — NALOXONE HYDROCHLORIDE 0.4 MG/ML
.1-.4 INJECTION, SOLUTION INTRAMUSCULAR; INTRAVENOUS; SUBCUTANEOUS
Status: DISCONTINUED | OUTPATIENT
Start: 2019-07-19 | End: 2019-07-19 | Stop reason: HOSPADM

## 2019-07-19 RX ORDER — ONDANSETRON 4 MG/1
4 TABLET, ORALLY DISINTEGRATING ORAL EVERY 30 MIN PRN
Status: DISCONTINUED | OUTPATIENT
Start: 2019-07-19 | End: 2019-07-19 | Stop reason: HOSPADM

## 2019-07-19 RX ORDER — TAMSULOSIN HYDROCHLORIDE 0.4 MG/1
0.4 CAPSULE ORAL DAILY
Qty: 7 CAPSULE | Refills: 0 | Status: SHIPPED | OUTPATIENT
Start: 2019-07-19 | End: 2019-07-26

## 2019-07-19 RX ORDER — ONDANSETRON 2 MG/ML
4 INJECTION INTRAMUSCULAR; INTRAVENOUS EVERY 30 MIN PRN
Status: DISCONTINUED | OUTPATIENT
Start: 2019-07-19 | End: 2019-07-19 | Stop reason: HOSPADM

## 2019-07-19 RX ORDER — FENTANYL CITRATE 50 UG/ML
25-50 INJECTION, SOLUTION INTRAMUSCULAR; INTRAVENOUS
Status: DISCONTINUED | OUTPATIENT
Start: 2019-07-19 | End: 2019-07-19 | Stop reason: HOSPADM

## 2019-07-19 RX ORDER — CEFAZOLIN SODIUM 1 G/3ML
1 INJECTION, POWDER, FOR SOLUTION INTRAMUSCULAR; INTRAVENOUS SEE ADMIN INSTRUCTIONS
Status: DISCONTINUED | OUTPATIENT
Start: 2019-07-19 | End: 2019-07-19 | Stop reason: HOSPADM

## 2019-07-19 RX ORDER — HYDRALAZINE HYDROCHLORIDE 20 MG/ML
2.5-5 INJECTION INTRAMUSCULAR; INTRAVENOUS EVERY 10 MIN PRN
Status: DISCONTINUED | OUTPATIENT
Start: 2019-07-19 | End: 2019-07-19 | Stop reason: HOSPADM

## 2019-07-19 RX ORDER — SODIUM CHLORIDE, SODIUM LACTATE, POTASSIUM CHLORIDE, CALCIUM CHLORIDE 600; 310; 30; 20 MG/100ML; MG/100ML; MG/100ML; MG/100ML
INJECTION, SOLUTION INTRAVENOUS CONTINUOUS
Status: DISCONTINUED | OUTPATIENT
Start: 2019-07-19 | End: 2019-07-19 | Stop reason: HOSPADM

## 2019-07-19 RX ORDER — AMOXICILLIN 250 MG
1-2 CAPSULE ORAL 2 TIMES DAILY
Qty: 10 TABLET | Refills: 0 | Status: SHIPPED | OUTPATIENT
Start: 2019-07-19

## 2019-07-19 RX ORDER — DIAZEPAM 10 MG/2ML
2.5 INJECTION, SOLUTION INTRAMUSCULAR; INTRAVENOUS
Status: DISCONTINUED | OUTPATIENT
Start: 2019-07-19 | End: 2019-07-19 | Stop reason: HOSPADM

## 2019-07-19 RX ORDER — OXYCODONE HYDROCHLORIDE 5 MG/1
5-10 TABLET ORAL EVERY 4 HOURS PRN
Qty: 10 TABLET | Refills: 0 | Status: SHIPPED | OUTPATIENT
Start: 2019-07-19

## 2019-07-19 RX ORDER — FENTANYL CITRATE 50 UG/ML
INJECTION, SOLUTION INTRAMUSCULAR; INTRAVENOUS PRN
Status: DISCONTINUED | OUTPATIENT
Start: 2019-07-19 | End: 2019-07-19

## 2019-07-19 RX ORDER — HYDROMORPHONE HYDROCHLORIDE 1 MG/ML
.3-.5 INJECTION, SOLUTION INTRAMUSCULAR; INTRAVENOUS; SUBCUTANEOUS EVERY 5 MIN PRN
Status: DISCONTINUED | OUTPATIENT
Start: 2019-07-19 | End: 2019-07-19 | Stop reason: HOSPADM

## 2019-07-19 RX ORDER — DIMENHYDRINATE 50 MG/ML
25 INJECTION, SOLUTION INTRAMUSCULAR; INTRAVENOUS
Status: DISCONTINUED | OUTPATIENT
Start: 2019-07-19 | End: 2019-07-19 | Stop reason: HOSPADM

## 2019-07-19 RX ORDER — EPHEDRINE SULFATE 50 MG/ML
INJECTION, SOLUTION INTRAVENOUS PRN
Status: DISCONTINUED | OUTPATIENT
Start: 2019-07-19 | End: 2019-07-19

## 2019-07-19 RX ORDER — GLYCOPYRROLATE 0.2 MG/ML
INJECTION, SOLUTION INTRAMUSCULAR; INTRAVENOUS PRN
Status: DISCONTINUED | OUTPATIENT
Start: 2019-07-19 | End: 2019-07-19

## 2019-07-19 RX ORDER — PHENYLEPHRINE HYDROCHLORIDE 10 MG/ML
INJECTION INTRAVENOUS PRN
Status: DISCONTINUED | OUTPATIENT
Start: 2019-07-19 | End: 2019-07-19

## 2019-07-19 RX ORDER — ALBUTEROL SULFATE 0.83 MG/ML
2.5 SOLUTION RESPIRATORY (INHALATION) EVERY 4 HOURS PRN
Status: DISCONTINUED | OUTPATIENT
Start: 2019-07-19 | End: 2019-07-19 | Stop reason: HOSPADM

## 2019-07-19 RX ORDER — PROPOFOL 10 MG/ML
INJECTION, EMULSION INTRAVENOUS PRN
Status: DISCONTINUED | OUTPATIENT
Start: 2019-07-19 | End: 2019-07-19

## 2019-07-19 RX ORDER — DEXAMETHASONE SODIUM PHOSPHATE 4 MG/ML
INJECTION, SOLUTION INTRA-ARTICULAR; INTRALESIONAL; INTRAMUSCULAR; INTRAVENOUS; SOFT TISSUE PRN
Status: DISCONTINUED | OUTPATIENT
Start: 2019-07-19 | End: 2019-07-19

## 2019-07-19 RX ORDER — LABETALOL 20 MG/4 ML (5 MG/ML) INTRAVENOUS SYRINGE
10
Status: DISCONTINUED | OUTPATIENT
Start: 2019-07-19 | End: 2019-07-19 | Stop reason: HOSPADM

## 2019-07-19 RX ADMIN — PHENYLEPHRINE HYDROCHLORIDE 100 MCG: 10 INJECTION INTRAVENOUS at 14:15

## 2019-07-19 RX ADMIN — CETIRIZINE HYDROCHLORIDE 10 MG: 10 TABLET, FILM COATED ORAL at 08:09

## 2019-07-19 RX ADMIN — HYDROMORPHONE HYDROCHLORIDE 1 MG: 1 INJECTION, SOLUTION INTRAMUSCULAR; INTRAVENOUS; SUBCUTANEOUS at 14:27

## 2019-07-19 RX ADMIN — MORPHINE SULFATE 2 MG: 2 INJECTION, SOLUTION INTRAMUSCULAR; INTRAVENOUS at 08:09

## 2019-07-19 RX ADMIN — PHENYLEPHRINE HYDROCHLORIDE 200 MCG: 10 INJECTION INTRAVENOUS at 14:25

## 2019-07-19 RX ADMIN — MORPHINE SULFATE 2 MG: 2 INJECTION, SOLUTION INTRAMUSCULAR; INTRAVENOUS at 00:54

## 2019-07-19 RX ADMIN — MORPHINE SULFATE 2 MG: 2 INJECTION, SOLUTION INTRAMUSCULAR; INTRAVENOUS at 00:09

## 2019-07-19 RX ADMIN — LIDOCAINE HYDROCHLORIDE 50 MG: 10 INJECTION, SOLUTION INFILTRATION; PERINEURAL at 14:00

## 2019-07-19 RX ADMIN — OMEPRAZOLE 20 MG: 20 CAPSULE, DELAYED RELEASE ORAL at 08:09

## 2019-07-19 RX ADMIN — MORPHINE SULFATE 4 MG: 2 INJECTION, SOLUTION INTRAMUSCULAR; INTRAVENOUS at 04:55

## 2019-07-19 RX ADMIN — PROCHLORPERAZINE EDISYLATE 10 MG: 5 INJECTION INTRAMUSCULAR; INTRAVENOUS at 00:54

## 2019-07-19 RX ADMIN — EPHEDRINE SULFATE 10 MG: 50 INJECTION, SOLUTION INTRAVENOUS at 14:22

## 2019-07-19 RX ADMIN — LEVOTHYROXINE SODIUM 100 MCG: 100 TABLET ORAL at 08:09

## 2019-07-19 RX ADMIN — MIDAZOLAM 2 MG: 1 INJECTION INTRAMUSCULAR; INTRAVENOUS at 13:55

## 2019-07-19 RX ADMIN — SODIUM CHLORIDE, POTASSIUM CHLORIDE, SODIUM LACTATE AND CALCIUM CHLORIDE: 600; 310; 30; 20 INJECTION, SOLUTION INTRAVENOUS at 08:39

## 2019-07-19 RX ADMIN — ONDANSETRON HYDROCHLORIDE 4 MG: 2 INJECTION, SOLUTION INTRAMUSCULAR; INTRAVENOUS at 14:29

## 2019-07-19 RX ADMIN — PROPOFOL 200 MG: 10 INJECTION, EMULSION INTRAVENOUS at 14:06

## 2019-07-19 RX ADMIN — GLYCOPYRROLATE 0.2 MG: 0.2 INJECTION, SOLUTION INTRAMUSCULAR; INTRAVENOUS at 14:00

## 2019-07-19 RX ADMIN — PHENYLEPHRINE HYDROCHLORIDE 200 MCG: 10 INJECTION INTRAVENOUS at 14:18

## 2019-07-19 RX ADMIN — DEXAMETHASONE SODIUM PHOSPHATE 4 MG: 4 INJECTION, SOLUTION INTRA-ARTICULAR; INTRALESIONAL; INTRAMUSCULAR; INTRAVENOUS; SOFT TISSUE at 14:13

## 2019-07-19 RX ADMIN — MORPHINE SULFATE 2 MG: 2 INJECTION, SOLUTION INTRAMUSCULAR; INTRAVENOUS at 08:36

## 2019-07-19 RX ADMIN — SODIUM CHLORIDE, POTASSIUM CHLORIDE, SODIUM LACTATE AND CALCIUM CHLORIDE: 600; 310; 30; 20 INJECTION, SOLUTION INTRAVENOUS at 13:55

## 2019-07-19 RX ADMIN — PHENYLEPHRINE HYDROCHLORIDE 200 MCG: 10 INJECTION INTRAVENOUS at 14:22

## 2019-07-19 RX ADMIN — ONDANSETRON HYDROCHLORIDE 4 MG: 2 INJECTION, SOLUTION INTRAMUSCULAR; INTRAVENOUS at 00:12

## 2019-07-19 RX ADMIN — FENTANYL CITRATE 50 MCG: 50 INJECTION, SOLUTION INTRAMUSCULAR; INTRAVENOUS at 14:24

## 2019-07-19 RX ADMIN — SODIUM CHLORIDE, POTASSIUM CHLORIDE, SODIUM LACTATE AND CALCIUM CHLORIDE: 600; 310; 30; 20 INJECTION, SOLUTION INTRAVENOUS at 02:45

## 2019-07-19 RX ADMIN — TAMSULOSIN HYDROCHLORIDE 0.4 MG: 0.4 CAPSULE ORAL at 08:09

## 2019-07-19 RX ADMIN — PHENYLEPHRINE HYDROCHLORIDE 100 MCG: 10 INJECTION INTRAVENOUS at 14:54

## 2019-07-19 RX ADMIN — FENTANYL CITRATE 50 MCG: 50 INJECTION, SOLUTION INTRAMUSCULAR; INTRAVENOUS at 14:00

## 2019-07-19 NOTE — OP NOTE
DATE OF SERVICE: 7/19/2019  PREOPERATIVE DIAGNOSIS: Left nephrolithiasis  POSTOPERATIVE DIAGNOSIS: Left nephrolithiasis    PROCEDURES PERFORMED:   1. Cystourethroscopy  2. Left ureteroscopy with stone extraction  3. Left retrograde pyelography with interpretation of intraoperative fluoroscopic imaging  4. Left ureteral stent placement    STAFF SURGEON: Johnathan Stanley MD  ANESTHESIA: General  ESTIMATED BLOOD LOSS: 1 cc  DRAINS/TUBES: 6 [place] x 26 cm double-J ureteral stent   COMPLICATIONS: None.   SPECIMEN: Stone for analysis  SIGNIFICANT FINDINGS: Distal left ureteral stone identified and extracted intact. Ureter evaluated and no residual stones noted.    BRIEF OPERATIVE INDICATIONS: Raj Cheema is a 43 year old male who recently presented with left ureteral calculi. After a discussion of all risks, benefits, and alternatives, the patient elected to proceed with definitive stone management. The patient understands the potential need for more than one procedure to eliminate all stone burden.     DESCRIPTION OF PROCEDURE:  After informed consent was verified, the patient was transported to the operating room, placed supine on the table. After adequate anesthesia was induced, he was placed in dorsal lithotomy and prepped and draped in the usual sterile fashion. A timeout was taken to confirm correct patient, procedure and laterality. Pre-operative IV antibiotics were administered.    A 22 [place] rigid cystoscope was inserted per a well-lubricated urethra. The anterior urethra was unremarkable. The ureteral orifices were orthotopic bilaterally. The left ureteral orifice was identified and cannulated with a Sensor wire and 6-Fr open-ended catheter. The wire passed without resistance into the upper pole.  A retrograde pyelogram showed distal ureteral stone with mild hydronephrosis, but no other filling defects. A semirigid ureteroscope was advanced under direct vision alongside the safety wire to the level of the stone.  A 3 mm stone was noted in the distal left ureter. A basket was used to grasp the stone with was withdrawn per urethra. Scope was reinserted and an additional small fragment was removed. Scope was advanced to proximal ureter, and no additional stones were noted. Pullback ureteroscopy was performed and showed no retained stone fragments or ureteral injury. A 6 Fr x 26 cm double-J stent was advanced over the Sensor wire, and a good proximal curl was seen in the renal pelvis and the distal curl was seen in the bladder. The bladder was drained.   The patient tolerated the procedure well.  No apparent complications. He was transported to the postanesthesia care unit in stable condition.      PLAN: Return in 1-2 weeks for cystoscopy and ureteral stent removal in the office.    Johnathan Stanley MD  Urology  [facility]  Clinic Phone [phone]

## 2019-07-19 NOTE — PLAN OF CARE
PRIMARY DIAGNOSIS: ACUTE RENAL COLIC    OUTPATIENT/OBSERVATION GOALS TO BE MET BEFORE DISCHARGE  1. Pain Status: Improved but still requiring IV narcotics.    2. Tolerating adequate PO diet: Yes    3. Surgical Intervention planned: N/A    4. Cleared by consultants (if involved): N/A    5. Return to near baseline physical activity: Yes    Discharge Planner Nurse   Safe discharge environment identified: Yes  Barriers to discharge: No       Entered by: Fior Escobedo 07/18/2019 5:00PM     Please review provider order for any additional goals.   Nurse to notify provider when observation goals have been met and patient is ready for discharge.    VSS A and Ox4. Pt managing pain with IV morphine. Regular diet, switching to NPO at midnight. Will continue to monitor.

## 2019-07-19 NOTE — PLAN OF CARE
PRIMARY DIAGNOSIS: ACUTE RENAL COLIC    OUTPATIENT/OBSERVATION GOALS TO BE MET BEFORE DISCHARGE  1. Pain Status: Improved but still requiring IV narcotics.    2. Tolerating adequate PO diet: No- NPO    3. Surgical Intervention planned: No    4. Cleared by consultants (if involved): No- urology consult    5. Return to near baseline physical activity: Yes    Discharge Planner Nurse   Safe discharge environment identified: Yes  Barriers to discharge: Yes       Entered by: Lawanda Lima 07/19/2019 9:19 AM     A&O x 4. Up independent. Straining all urine. Morphine for pain. Denies nausea at this time.   Urology consult  Please review provider order for any additional goals.   Nurse to notify provider when observation goals have been met and patient is ready for discharge.   Yes

## 2019-07-19 NOTE — DISCHARGE INSTRUCTIONS
GENERAL ANESTHESIA OR SEDATION ADULT DISCHARGE INSTRUCTIONS   SPECIAL PRECAUTIONS FOR 24 HOURS AFTER SURGERY    IT IS NOT UNUSUAL TO FEEL LIGHT-HEADED OR FAINT, UP TO 24 HOURS AFTER SURGERY OR WHILE TAKING PAIN MEDICATION.  IF YOU HAVE THESE SYMPTOMS; SIT FOR A FEW MINUTES BEFORE STANDING AND HAVE SOMEONE ASSIST YOU WHEN YOU GET UP TO WALK OR USE THE BATHROOM.    YOU SHOULD REST AND RELAX FOR THE NEXT 24 HOURS AND YOU MUST MAKE ARRANGEMENTS TO HAVE SOMEONE STAY WITH YOU FOR AT LEAST 24 HOURS AFTER YOUR DISCHARGE.  AVOID HAZARDOUS AND STRENUOUS ACTIVITIES.  DO NOT MAKE IMPORTANT DECISIONS FOR 24 HOURS.    DO NOT DRIVE ANY VEHICLE OR OPERATE MECHANICAL EQUIPMENT FOR 24 HOURS FOLLOWING THE END OF YOUR SURGERY.  EVEN THOUGH YOU MAY FEEL NORMAL, YOUR REACTIONS MAY BE AFFECTED BY THE MEDICATION YOU HAVE RECEIVED.    DO NOT DRINK ALCOHOLIC BEVERAGES FOR 24 HOURS FOLLOWING YOUR SURGERY.    DRINK CLEAR LIQUIDS (APPLE JUICE, GINGER ALE, 7-UP, BROTH, ETC.).  PROGRESS TO YOUR REGULAR DIET AS YOU FEEL ABLE.    YOU MAY HAVE A DRY MOUTH, A SORE THROAT, MUSCLES ACHES OR TROUBLE SLEEPING.  THESE SHOULD GO AWAY AFTER 24 HOURS.    CALL YOUR DOCTOR FOR ANY OF THE FOLLOWING:  SIGNS OF INFECTION (FEVER, GROWING TENDERNESS AT THE SURGERY SITE, A LARGE AMOUNT OF DRAINAGE OR BLEEDING, SEVERE PAIN, FOUL-SMELLING DRAINAGE, REDNESS OR SWELLING.    IT HAS BEEN OVER 8 TO 10 HOURS SINCE SURGERY AND YOU ARE STILL NOT ABLE TO URINATE (PASS WATER).           CYSTOSCOPY DISCHARGE INSTRUCTIONS  [facility]  [address]  [phone]    YOU MAY GO BACK TO YOUR NORMAL DIET AND ACTIVITY, UNLESS YOUR DOCTOR TELLS YOU NOT TO.    FOR THE NEXT TWO DAYS, YOU MAY NOTICE:    SOME BLOOD IN YOUR URINE.  SOME BURNING WHEN YOU URINATE.  AN URGE TO URINATE MORE OFTEN.  BLADDER SPASMS.    THESE ARE NORMAL AFTER THE PROCEDURE.  THEY SHOULD GO AWAY AFTER A DAY OR TWO.  TO RELIEVE THESE PROBLEMS:     DRINK 6 TO 8 LARGE GLASSES OF WATER  EACH DAY (INCLUDES DRINKS AT MEALS).  THIS WILL HELP CLEAR THE URINE.    TAKE WARM BATHS TO RELIEVE PAIN AND BLADDER SPASMS.  DO NOT ADD ANYTHING TO THE BATH WATER.    YOUR DOCTOR MAY PRESCRIBE PAIN MEDICINE.  YOU MAY ALSO TAKE TYLENOL (ACETAMINOPHEN) FOR PAIN.    CALL YOUR SURGEON IF YOU HAVE:    A FEVER OVER 101 DEGREES.  CHECK YOUR TEMPERATURE UNDER YOUR TONGUE.    CHILLS.    FAILURE TO URINATE (NO URINE COMES OUT WHEN YOU TRY TO USE THE TOILET).  TRY SOAKING IN A BATHTUB FULL OF WARM WATER.  IF STILL NO URINE, CALL YOUR DOCTOR.    A LOT OF BLOOD IN THE URINE, OR BLOOD CLOTS LARGER THAN A NICKEL.      PAIN IN THE BACK OR BELLY AREA (ABDOMEN).    PAIN OR SPASMS THAT ARE NOT RELIEVED BY WARM TUB BATHS AND PAIN MEDICINE.      SEVERE PAIN, BURNING OR OTHER PROBLEMS WHILE PASSING URINE.    PAIN THAT GETS WORSE AFTER TWO DAYS.

## 2019-07-19 NOTE — ANESTHESIA POSTPROCEDURE EVALUATION
Patient: Raj Cheema    Procedure(s):  Cystoscopy with left urethral stent insertion    Diagnosis:.  Diagnosis Additional Information: No value filed.    Anesthesia Type:  General, LMA    Note:  Anesthesia Post Evaluation    Patient location during evaluation: PACU  Patient participation: Able to fully participate in evaluation  Level of consciousness: awake and alert  Pain management: adequate  Airway patency: patent  Cardiovascular status: acceptable  Respiratory status: acceptable  Hydration status: acceptable  PONV: none     Anesthetic complications: None          Last vitals:  Vitals:    07/19/19 0748 07/19/19 1230 07/19/19 1510   BP: (!) 151/99 (!) 147/104 130/88   Pulse:   95   Resp: 16 16 8   Temp: 97.8  F (36.6  C) 97.9  F (36.6  C) 96.5  F (35.8  C)   SpO2: 94% 97% 97%         Electronically Signed By: Fabio Beckman MD  July 19, 2019  3:34 PM

## 2019-07-19 NOTE — ANESTHESIA PREPROCEDURE EVALUATION
Anesthesia Pre-Procedure Evaluation    Patient: Raj Cheema   MRN: [id number] : 1975          Preoperative Diagnosis: .    Procedure(s):  Cystoscopy with left urethral stent insertion    Past Medical History:   Diagnosis Date     Arthritis      GI problem      Hypertension      Past Surgical History:   Procedure Laterality Date      KNEE SCOPE,MED/LAT MENISCUS REPAIR       Anesthesia Evaluation     . Pt has had prior anesthetic. Type: General    No history of anesthetic complications          ROS/MED HX    ENT/Pulmonary:  - neg pulmonary ROS     Neurologic:  - neg neurologic ROS     Cardiovascular:     (+) hypertension----. : . . . :. .       METS/Exercise Tolerance:     Hematologic:  - neg hematologic  ROS       Musculoskeletal:  - neg musculoskeletal ROS       GI/Hepatic:     (+) GERD Asymptomatic on medication,       Renal/Genitourinary:     (+) Nephrolithiasis ,       Endo: Comment: Class 1    (+) Obesity, .      Psychiatric:  - neg psychiatric ROS       Infectious Disease:  - neg infectious disease ROS       Malignancy:      - no malignancy   Other:    - neg other ROS                      Physical Exam  Normal systems: cardiovascular, pulmonary and dental    Airway   Mallampati: II  TM distance: >3 FB  Neck ROM: full    Dental     Cardiovascular   Rhythm and rate: regular and normal      Pulmonary    breath sounds clear to auscultation    Other findings: Lab Test        19                       0548          0709          WBC          14.7*        7.7           HGB          15.7         16.5          MCV          98           94            PLT          179          231            Lab Test        19                       0548          1421          0709          NA           138           --          139           POTASSIUM    3.6          4.2          2.5*          CHLORIDE     105           --          104           CO2          28             "--          26            BUN          14            --          16            CR           1.28*         --          1.14          ANIONGAP     5             --          9             JUAN          8.6           --          9.3           GLC          133*          --          190*                     Lab Results   Component Value Date    WBC 14.7 (H) 07/19/2019    HGB 15.7 07/19/2019    HCT 45.6 07/19/2019     07/19/2019     07/19/2019    POTASSIUM 3.6 07/19/2019    CHLORIDE 105 07/19/2019    CO2 28 07/19/2019    BUN 14 07/19/2019    CR 1.28 (H) 07/19/2019     (H) 07/19/2019    JUAN 8.6 07/19/2019    PHOS 3.0 07/19/2019    MAG 2.0 07/18/2019    ALBUMIN 3.9 07/18/2019    PROTTOTAL 6.9 07/18/2019     (H) 07/18/2019    AST 61 (H) 07/18/2019    ALKPHOS 100 07/18/2019    BILITOTAL 0.5 07/18/2019    LIPASE 155 07/18/2019       Preop Vitals  BP Readings from Last 3 Encounters:   07/19/19 (!) 147/104   03/19/13 112/72   12/04/12 142/92    Pulse Readings from Last 3 Encounters:   07/18/19 79      Resp Readings from Last 3 Encounters:   07/19/19 16    SpO2 Readings from Last 3 Encounters:   07/19/19 97%      Temp Readings from Last 1 Encounters:   07/19/19 97.9  F (36.6  C) (Temporal)    Ht Readings from Last 1 Encounters:   03/19/13 1.778 m (5' 10\")      Wt Readings from Last 1 Encounters:   03/19/13 106.6 kg (235 lb)    Estimated body mass index is 33.72 kg/m  as calculated from the following:    Height as of 3/19/13: 1.778 m (5' 10\").    Weight as of 3/19/13: 106.6 kg (235 lb).       Anesthesia Plan      History & Physical Review  History and physical reviewed and following examination; no interval change.    ASA Status:  2 .    NPO Status:  > 8 hours    Plan for General and LMA with Intravenous induction. Maintenance will be Balanced.    PONV prophylaxis:  Ondansetron (or other 5HT-3) and Dexamethasone or Solumedrol       Postoperative Care  Postoperative pain management:  IV analgesics, Oral " pain medications and Multi-modal analgesia.      Consents  Anesthetic plan, risks, benefits and alternatives discussed with:  Patient.  Use of blood products discussed: No .   .                 Lamberto No MD                    .

## 2019-07-19 NOTE — PLAN OF CARE
PRIMARY DIAGNOSIS: ACUTE RENAL COLIC     OUTPATIENT/OBSERVATION GOALS TO BE MET BEFORE DISCHARGE  1. Pain Status: Improved but still requiring IV narcotics.     2. Tolerating adequate PO diet: Yes     3. Surgical Intervention planned: N/A     4. Cleared by consultants (if involved): N/A     5. Return to near baseline physical activity: Yes     Discharge Planner Nurse   Safe discharge environment identified: Yes  Barriers to discharge: No       Entered by: Fior Escobedo 07/18/2019 9:10PM  Please review provider order for any additional goals.   Nurse to notify provider when observation goals have been met and patient is ready for discharge.     VSS A and Ox4. Pt managing pain with IV morphine. Regular diet, switching to NPO at midnight. Pt. Is resting in bed. Will continue to monitor

## 2019-07-19 NOTE — PLAN OF CARE
PRIMARY DIAGNOSIS: ACUTE RENAL COLIC    OUTPATIENT/OBSERVATION GOALS TO BE MET BEFORE DISCHARGE  1. Pain Status: Improved but still requiring IV narcotics.    2. Tolerating adequate PO diet: Yes    3. Surgical Intervention planned: N/A    4. Cleared by consultants (if involved): N/A    5. Return to near baseline physical activity: Yes    Discharge Planner Nurse   Safe discharge environment identified: Yes  Barriers to discharge: No       Entered by: Sharon MEDINA Che 07/19/2019 5:00PM     Please review provider order for any additional goals.   Nurse to notify provider when observation goals have been met and patient is ready for discharge.    VSS A and O x4. Pt managing pain with IV morphine. Had emesis x 1 Zofran given with no relief and and compazine given and it helped.  NPO after midnight Straining urine. Will continue to monitor.

## 2019-07-19 NOTE — CONSULTS
[facility] Consultation by [facility]    Raj Cheema MRN# [id number]   Age: 43 year old YOB: 1975     Date of Admission:  7/18/2019    Reason for consult: Renal/ureteral calculus       Requesting PA/MD: SEAN Mendoza       Level of consult: Consult, follow and place orders           Assessment and Plan:   Assessment:   Renal colic, left ureteral stone, rising WBC and Cr. Still having left lower quadrant pain. We discussed the options of observation vs proceeding to OR. We discussed plan for ureteroscopy with possible stent placement. Will attempt to remove stone, if possible today. Plan for discharge after stone removal.      Plan:   NPO  Cysto, left ureteral stent today. Plan for ureteral stent placement and likely discharge home following procedure.  Discussed with IM and Dr. Stanley.     Trish Briones PA-C  [facility]  [phone]         Discussed with Trish Briones PA-C. Note edited and update per my findings and exam.    Johnathan Stanley MD  Urology  [facility]  Clinic Phone [phone]            Chief Complaint:     History is obtained from the patient and EMR.         History of Present Illness:     This patient is a 43 year old male admitted with LLQ pain and found to have a 2mm distal left ureteral stone. Pain started yesterday, acute onset, sharp pain. Has radiated to left groin. Has not passed since admission. Vomited over night. Still in pain this AM. 1st stone episode. Daughter recently had stones.     Still requiring IV narcs. WBC up (14.7), Cr up (1.28). Afebrile.             Past Medical History:     Past Medical History:   Diagnosis Date     Arthritis      GI problem      Hypertension            Past Surgical History:     Past Surgical History:   Procedure Laterality Date     HC KNEE SCOPE,MED/LAT MENISCUS REPAIR  1997           Social History:     Social History     Tobacco Use     Smoking status: Never Smoker     Smokeless tobacco:  Never Used   Substance Use Topics     Alcohol use: Not on file           Family History:     Family History   Problem Relation Age of Onset     Diabetes Mother      Allergies Mother      Thyroid Disease Mother      Eye Disorder Father      Family history reviewed and updated in EPIC and reviewed          Allergies:     No Known Allergies          Medications:     Current Facility-Administered Medications   Medication     acetaminophen (TYLENOL) Suppository 650 mg     acetaminophen (TYLENOL) tablet 650 mg     amphetamine-dextroamphetamine (ADDERALL) per tablet 20 mg     cetirizine (zyrTEC) tablet 10 mg     lactated ringers infusion     levothyroxine (SYNTHROID/LEVOTHROID) tablet 100 mcg     lidocaine (LMX4) cream     lidocaine 1 % 0.1-1 mL     magnesium sulfate 4 g in 100 mL sterile water (premade)     melatonin tablet 1 mg     morphine (PF) injection 2-4 mg     naloxone (NARCAN) injection 0.1-0.4 mg     omeprazole (priLOSEC) CR capsule 20 mg     ondansetron (ZOFRAN-ODT) ODT tab 4 mg    Or     ondansetron (ZOFRAN) injection 4 mg     oxyCODONE (ROXICODONE) tablet 5-10 mg     polyethylene glycol (MIRALAX/GLYCOLAX) Packet 17 g     potassium chloride (KLOR-CON) Packet 20-40 mEq     potassium chloride 10 mEq in 100 mL intermittent infusion with 10 mg lidocaine     potassium chloride 10 mEq in 100 mL sterile water intermittent infusion (premix)     potassium chloride 20 mEq in 50 mL intermittent infusion     potassium chloride ER (K-DUR/KLOR-CON M) CR tablet 20-40 mEq     potassium phosphate 15 mmol in D5W 250 mL intermittent infusion     potassium phosphate 20 mmol in D5W 250 mL intermittent infusion     potassium phosphate 20 mmol in D5W 500 mL intermittent infusion     potassium phosphate 25 mmol in D5W 500 mL intermittent infusion     prochlorperazine (COMPAZINE) injection 10 mg    Or     prochlorperazine (COMPAZINE) tablet 10 mg    Or     prochlorperazine (COMPAZINE) Suppository 25 mg     senna-docusate  (SENOKOT-S/PERICOLACE) 8.6-50 MG per tablet 1 tablet    Or     senna-docusate (SENOKOT-S/PERICOLACE) 8.6-50 MG per tablet 2 tablet     sodium chloride (PF) 0.9% PF flush 3 mL     sodium chloride (PF) 0.9% PF flush 3 mL     tamsulosin (FLOMAX) capsule 0.4 mg           Review of Systems:   A comprehensive review of systems was performed and found to be negative except as described in the HPI.     BP (!) 151/99 (BP Location: Left arm)   Pulse 79   Temp 97.8  F (36.6  C) (Oral)   Resp 16   SpO2 94%   GEN: NAD, lying in bed  EYES: EOMI  MOUTH: MMM  NECK: Supple  RESP: Unlabored breathing  CARDIAC: No LE edema  SKIN: Warm  ABD: soft  NEURO: AAO            Data:     Lab Results   Component Value Date    WBC 14.7 (H) 07/19/2019    HGB 15.7 07/19/2019    HCT 45.6 07/19/2019    MCV 98 07/19/2019     07/19/2019     Lab Results   Component Value Date    CR 1.28 (H) 07/19/2019     CT ABDOMEN AND PELVIS WITHOUT CONTRAST 7/18/2019 8:30 AM     TECHNIQUE: Images from diaphragm to pubic symphysis without contrast.  Radiation dose for this scan was reduced using automated exposure  control, adjustment of the mA and/or kV according to patient size, or  iterative reconstruction technique.     HISTORY: Left-sided pain.     COMPARISON: None.     FINDINGS:   Abdomen and Pelvis: 0.2 cm distal left ureter stone 1.5 cm from the  UVJ with mild left hydronephrosis and hydroureter ureter. Additional  tiny nonobstructing right intrarenal calculi 0.3 cm or less, equivocal  tiny left 0.1 cm stone, series 3 image 90. Lung base is clear. Diffuse  fatty infiltration of the liver. Within the limits of a noncontrast  CT, the spleen, gallbladder, and adrenal glands appear normal. There  is some lobulation along the anterior proximal pancreatic tail, series  2 image 28, pancreas otherwise unremarkable.                                                                      IMPRESSION:   1. Left hydronephrosis due to 0.2 cm distal left ureter stone  at the  level of the acetabulum approximately 1.5 cm from the ureterovesical  junction.  2. Tiny intrarenal nonobstructing calculi, 0.3 cm or less.  3. fatty liver.  4. There is lobulation of the contour of the pancreas, anterior  fullness at the proximal pancreatic tail of uncertain significance. If  the patient has any outside prior exam, comparison is recommended to  see whether this is stable. If no comparison exam to document  stability, consider follow-up or further evaluation with dedicated  pancreas protocol CT with IV contrast.

## 2019-07-19 NOTE — PLAN OF CARE
PRIMARY DIAGNOSIS: ACUTE RENAL COLIC    OUTPATIENT/OBSERVATION GOALS TO BE MET BEFORE DISCHARGE  1. Pain Status: Improved but still requiring IV narcotics.    2. Tolerating adequate PO diet: Yes    3. Surgical Intervention planned: N/A    4. Cleared by consultants (if involved): N/A    5. Return to near baseline physical activity: Yes    Discharge Planner Nurse   Safe discharge environment identified: Yes  Barriers to discharge: No       Entered by: Sharon MEDINA Che 07/19/2019 5:00PM     Please review provider order for any additional goals.   Nurse to notify provider when observation goals have been met and patient is ready for discharge.    VSS A and O x4. Pt managing pain with IV morphine 4 mg PRN. Had emesis x 1 Zofran given with no relief and and compazine given and it helped.  NPO after midnight Straining urine. Will continue to monitor.

## 2019-07-22 NOTE — DISCHARGE SUMMARY
[facility] Observation Unit Discharge Summary          Raj Cheema MRN# [id number]   Age: 43 year old YOB: 1975     Date of Admission:  7/18/2019  Date of Discharge::  7/19/2019  Admitting Physician:  Cassia Dimas MD  Discharge Physician:  Marisol Stack PA-C  Primary Physician: Clinic, [facility]     Primary Discharge Diagnoses:   Left Nephrolithiasis  Pre-DM  Elevated LFTs     Secondary Discharge Diagnoses:   HTN  Hypothyroidism  Wright's Esophagus  ADHD     Hospital Course:   For detail history, please refer to H & P from 7/18/2019. In brief, this is a 43 year old male with a PMH significant for hypertension, hypothyroidism Wright's esophagus, ADHD and allergies who presented to the ED on 7/18/19 with intractable left lower abdominal pain.    ED work up revealed patient hypertensive 140-150/90s, afebrile on room air.  Laboratory work up revealed potassium 2.5 with otherwise normal BMP, Magnesium, CBC.  UA with glucose, trace ketones with no signs of acute infection.  CT abd/pelvis with Left hydronephrosis due to 0.2 cm distal left ureter stone at the, level of the acetabulum approximately 1.5 cm from the ureterovesical junction and tiny intrarenal nonobstructing calculi, 0.3 cm or less.  Fatty liver and a lobulation of the contour of the pancreas, anterior fullness at the proximal pancreatic tail of uncertain significance.    Patient was admitted to the observation unit.  Creatinine angi to 1.28 and wbc to 14.7.  Potassium was replaced.  Urology was consulted and patient underwent a Cystourethroscopy, Left ureteroscopy with stone extraction. Left retrograde pyelography with interpretation of intraoperative fluoroscopic imaging. Left ureteral stent placement.  Patient was instructed to follow up with Urology in 1-2 weeks for cystoscopy and ureteral stent removal in the office.    Additionally, patient's blood pressure was noted to be elevated at times  throughout his stay.  He was previously on antihypertensives.  Patient was instructed to monitor his blood pressure at home twice daily and return to his pcp with a record in one week.  He may need to restart antihypertensives.    Incidentally found on CT imaging was a fatty liver and a lobulation of the contour of the pancreas, anterior fullness at the proximal pancreatic tail of uncertain significance.  Patient's lipase, Tbili and Alk phos were wnl.  AST and ALT were mildly elevated which they have been in the past per chart review.  Patient was instructed to follow up with his pcp for ongoing work up of his elevated lfts and a dedicated pancreas protocol CT with IV contrast was recommended.    Additionally, patient's UA showed glucose and trace ketones.  A hemoglobin A1C was 6.1 putting the patient in the pre-diabetic range.  Follow up with PCP and nutrition in clinic was recommended.      Procedures/Imaging:     Results for orders placed or performed during the hospital encounter of 07/18/19   Abd/pelvis CT no contrast - Stone Protocol    Narrative    CT ABDOMEN AND PELVIS WITHOUT CONTRAST 7/18/2019 8:30 AM    TECHNIQUE: Images from diaphragm to pubic symphysis without contrast.  Radiation dose for this scan was reduced using automated exposure  control, adjustment of the mA and/or kV according to patient size, or  iterative reconstruction technique.    HISTORY: Left-sided pain.    COMPARISON: None.    FINDINGS:   Abdomen and Pelvis: 0.2 cm distal left ureter stone 1.5 cm from the  UVJ with mild left hydronephrosis and hydroureter ureter. Additional  tiny nonobstructing right intrarenal calculi 0.3 cm or less, equivocal  tiny left 0.1 cm stone, series 3 image 90. Lung base is clear. Diffuse  fatty infiltration of the liver. Within the limits of a noncontrast  CT, the spleen, gallbladder, and adrenal glands appear normal. There  is some lobulation along the anterior proximal pancreatic tail, series  2 image 28,  pancreas otherwise unremarkable.      Impression    IMPRESSION:   1. Left hydronephrosis due to 0.2 cm distal left ureter stone at the  level of the acetabulum approximately 1.5 cm from the ureterovesical  junction.  2. Tiny intrarenal nonobstructing calculi, 0.3 cm or less.  3. fatty liver.  4. There is lobulation of the contour of the pancreas, anterior  fullness at the proximal pancreatic tail of uncertain significance. If  the patient has any outside prior exam, comparison is recommended to  see whether this is stable. If no comparison exam to document  stability, consider follow-up or further evaluation with dedicated  pancreas protocol CT with IV contrast.    BEN PARKER MD   XR Surgery CAPRI Fluoro L/T 5 Min w Stills    Narrative    This exam was marked as non-reportable because it will not be read by a   radiologist or a [place] non-radiologist provider.                 Consultations:   Urology    Allergies:   No Known Allergies     Subjective:   Patient reports ongoing abdominal pain.  No emesis overnight.     Physical Exam:   Blood pressure 114/74, pulse 90, temperature 97.6  F (36.4  C), temperature source Temporal, resp. rate 15, SpO2 92 %.  General: Alert, interactive, NAD  Resp: clear to auscultation bilaterally, no crackles or wheezes  Cardiac: regular rate and rhythm, no murmur  Abdomen: Soft, mild left flank tenderness, nondistended. +BS.  No rebound or guarding.  Extremities: No LE edema  Neuro: Alert & oriented x 3   Discharge Medicatios:        Discharge Medication List as of 7/19/2019  4:25 PM      START taking these medications    Details   oxyCODONE (ROXICODONE) 5 MG tablet Take 1-2 tablets (5-10 mg) by mouth every 4 hours as needed for moderate to severe pain, Disp-10 tablet, R-0, Local Print      senna-docusate (SENOKOT-S/PERICOLACE) 8.6-50 MG tablet Take 1-2 tablets by mouth 2 times daily, Disp-10 tablet, R-0, E-Prescribe      tamsulosin (FLOMAX) 0.4 MG capsule Take 1 capsule (0.4 mg)  by mouth daily for 7 days, Disp-7 capsule, R-0, E-Prescribe         CONTINUE these medications which have NOT CHANGED    Details   amphetamine-dextroamphetamine (ADDERALL) 20 MG tablet Take 20 mg by mouth 2 times daily, Historical      Azelastine HCl 137 MCG/SPRAY SOLN Spray 1 spray into both nostrils 2 times daily, Historical      calcium carbonate (TUMS) 500 MG chewable tablet Take 2 chew tab by mouth At Bedtime, Historical      cetirizine (ZYRTEC) 10 MG tablet Take 10 mg by mouth 2 times daily, Historical      fluticasone (FLONASE) 50 MCG/ACT nasal spray Spray 2 sprays into both nostrils daily as needed for rhinitis or allergies, Historical      levothyroxine (SYNTHROID/LEVOTHROID) 100 MCG tablet Take 100 mcg by mouth daily, Historical      omeprazole (PRILOSEC) 20 MG DR capsule Take 20 mg by mouth daily, Historical             Instructions Given to Patient as Discharge:     Discharge Procedure Orders   Reason for your hospital stay   Order Comments: You were hospitalized due to kidney stones.  Urology was consulted for stone removal.     Activity   Order Comments: Your activity upon discharge: activity as tolerated     Order Specific Question Answer Comments   Is discharge order? Yes      When to contact your care team   Order Comments: Notify for fever >101.5; black or bloody stools; severe, persistent, or worsening nausea, vomiting, abdominal pain or distention, diarrhea, constipation; chest pain, difficulty breathing, swelling; dizziness, weakness, lightheadedness, fainting.  Proceed to the nearest emergency room for any urgent concerns.     Follow-up and recommended labs and tests    Order Comments: Please follow up with Urology as instructed.    Your blood pressures were elevated which may be due to underlying hypertension vs. Acute pain.  Please monitor your blood pressures twice daily at home and follow up with your PCP within one week.  You may need to restart antihypertensive medication.    Incidentally  found on your CT scan was a pancreatic tail fullness.  CT scan showed lobulation of the contour of the pancreas with anterior fullness at the proximal pancreatic tail of uncertain significance.  This was discussed with you.  Please follow up with your PCP within one week for a dedicated pancreas CT.  Please follow up with your GI doctor as well for further work up.     No driving or operating machinery    Order Comments: until the day after procedure     No Alcohol   Order Comments: for 24 hours post procedure     Diet Instructions   Order Comments: Resume pre procedure diet     Encourage fluids   Order Comments: Encourage fluids at home to keep urine clear to light pink     Discharge Instructions   Order Comments: Patient to arrange for follow up appointment in 1 week with Dr. Romero for cystoscopy and ureteral stent removal.     Diet   Order Comments: Follow this diet upon discharge: regular     Order Specific Question Answer Comments   Is discharge order? Yes        Pending Tests at Discharge:   Surgical pathology    Discharge Disposition:   Discharged to home     Marisol Stack MS, PA-C  Hospitalist Service  Pager [phone]    >30 minutes was spent in discharge planning, care coordination, physical examination and medication reconciliation on the date of discharge, 7/19/2019

## 2019-07-24 ENCOUNTER — OFFICE VISIT (OUTPATIENT)
Dept: UROLOGY | Facility: CLINIC | Age: 44
End: 2019-07-24
Payer: COMMERCIAL

## 2019-07-24 VITALS
SYSTOLIC BLOOD PRESSURE: 146 MMHG | BODY MASS INDEX: 34.07 KG/M2 | OXYGEN SATURATION: 97 % | HEIGHT: 70 IN | HEART RATE: 108 BPM | WEIGHT: 238 LBS | DIASTOLIC BLOOD PRESSURE: 106 MMHG

## 2019-07-24 DIAGNOSIS — N20.0 KIDNEY STONE: Primary | ICD-10-CM

## 2019-07-24 LAB
APPEARANCE STONE: NORMAL
COMPN STONE: NORMAL
NUMBER STONE: 2
SIZE STONE: NORMAL MM
WT STONE: 6 MG

## 2019-07-24 PROCEDURE — 52310 CYSTOSCOPY AND TREATMENT: CPT | Performed by: UROLOGY

## 2019-07-24 RX ORDER — CIPROFLOXACIN 500 MG/1
500 TABLET, FILM COATED ORAL ONCE
Qty: 1 TABLET | Refills: 0 | Status: SHIPPED | OUTPATIENT
Start: 2019-07-24 | End: 2019-07-24

## 2019-07-24 RX ORDER — LIDOCAINE HYDROCHLORIDE 20 MG/ML
JELLY TOPICAL ONCE
Status: ACTIVE | OUTPATIENT
Start: 2019-07-24

## 2019-07-24 ASSESSMENT — MIFFLIN-ST. JEOR: SCORE: 1985.81

## 2019-07-24 ASSESSMENT — PAIN SCALES - GENERAL: PAINLEVEL: NO PAIN (1)

## 2019-07-24 NOTE — PATIENT INSTRUCTIONS

## 2019-07-24 NOTE — PROGRESS NOTES
Office Visit Note  [facility]  [phone]    UROLOGIC DIAGNOSES:   Left ureteral stone    CURRENT INTERVENTIONS:   S/P Extraction    HISTORY:   This is a 43-year-old gentleman who had a left ureteral stone removed by Dr. Stanley last week. He is here today for stent removal. Stone was composed of calcium oxalate and calcium phosphate. His serum calcium levels are normal. He has felt well since his procedure.      PAST MEDICAL HISTORY:   Past Medical History:   Diagnosis Date     Arthritis      GI problem      Hypertension        PAST SURGICAL HISTORY:   Past Surgical History:   Procedure Laterality Date     CYSTOSCOPY, RETROGRADES, INSERT STENT URETER(S), COMBINED Left 7/19/2019    Procedure: 1. Cystourethroscopy 2. Left ureteroscopy with stone extraction 3. Left retrograde pyelography with interpretation of intraoperative fluoroscopic imaging 4. Left ureteral stent placement;  Surgeon: Johnathan Stanley MD;  Location: [facility] OR      KNEE SCOPE,MED/LAT MENISCUS REPAIR  1997       FAMILY HISTORY:   Family History   Problem Relation Age of Onset     Diabetes Mother      Allergies Mother      Thyroid Disease Mother      Eye Disorder Father        SOCIAL HISTORY:   Social History     Tobacco Use     Smoking status: Never Smoker     Smokeless tobacco: Never Used   Substance Use Topics     Alcohol use: Not on file       Current Outpatient Medications   Medication     amphetamine-dextroamphetamine (ADDERALL) 20 MG tablet     Azelastine HCl 137 MCG/SPRAY SOLN     calcium carbonate (TUMS) 500 MG chewable tablet     cetirizine (ZYRTEC) 10 MG tablet     fluticasone (FLONASE) 50 MCG/ACT nasal spray     levothyroxine (SYNTHROID/LEVOTHROID) 100 MCG tablet     omeprazole (PRILOSEC) 20 MG DR capsule     oxyCODONE (ROXICODONE) 5 MG tablet     senna-docusate (SENOKOT-S/PERICOLACE) 8.6-50 MG tablet     tamsulosin (FLOMAX) 0.4 MG capsule     No current facility-administered medications for this visit.           PHYSICAL EXAM:    There were no vitals taken for this visit.    Constitutional: Well developed. Conversant and in no acute distress  Eyes: Anicteric sclera, conjunctiva clear, normal extraocular movements  ENT: Normocephalic and atraumatic,   Skin: Warm and dry. No rashes or lesions  Cardiac: No peripheral edema  Back/Flank: Not done  CNS/PNS: Normal musculature and movements, moves all extremities normally  Respiratory: Normal non-labored breathing  Abdomen: Soft nontender and nondistended  Peripheral Vascular: No peripheral edema  Mental Status/Psych: Alert and Oriented x 3. Normal mood and affect    Penis: Not done  Scrotal Skin: Not done  Testicles: Not done  Epididymis: Not done  Digital Rectal Exam:     Cystoscopy: I performed a cystoscopy today and remove the stent without difficulty    Imaging: None    Urinalysis: UA RESULTS:  Recent Labs   Lab Test 07/18/19  0902   COLOR Straw   APPEARANCE Clear   URINEGLC 100*   URINEBILI Negative   URINEKETONE Trace*   SG 1.010   UBLD Negative   URINEPH 8.0*   PROTEIN Negative   NITRITE Negative   LEUKEST Negative   RBCU 5*   WBCU <1       PSA:     Post Void Residual:     Other labs: None today      IMPRESSION:  Doing well, stent removed    PLAN:  His stent was removed in clinic today. He will follow-up as needed in the future.      Lopez Romero M.D.

## 2019-07-24 NOTE — LETTER
7/24/2019       RE: Raj Cheema  [address]  [place] [address]     Dear Colleague,    Thank you for referring your patient, Raj Cheema, to the [facility] [place] at [facility]. Please see a copy of my visit note below.    Office Visit Note  [facility]  [phone]    UROLOGIC DIAGNOSES:   Left ureteral stone    CURRENT INTERVENTIONS:   S/P Extraction    HISTORY:   This is a 43-year-old gentleman who had a left ureteral stone removed by Dr. Stanley last week. He is here today for stent removal. Stone was composed of calcium oxalate and calcium phosphate. His serum calcium levels are normal. He has felt well since his procedure.      PAST MEDICAL HISTORY:   Past Medical History:   Diagnosis Date     Arthritis      GI problem      Hypertension        PAST SURGICAL HISTORY:   Past Surgical History:   Procedure Laterality Date     CYSTOSCOPY, RETROGRADES, INSERT STENT URETER(S), COMBINED Left 7/19/2019    Procedure: 1. Cystourethroscopy 2. Left ureteroscopy with stone extraction 3. Left retrograde pyelography with interpretation of intraoperative fluoroscopic imaging 4. Left ureteral stent placement;  Surgeon: Johnathan Stanley MD;  Location:  OR      KNEE SCOPE,MED/LAT MENISCUS REPAIR  1997       FAMILY HISTORY:   Family History   Problem Relation Age of Onset     Diabetes Mother      Allergies Mother      Thyroid Disease Mother      Eye Disorder Father        SOCIAL HISTORY:   Social History     Tobacco Use     Smoking status: Never Smoker     Smokeless tobacco: Never Used   Substance Use Topics     Alcohol use: Not on file       Current Outpatient Medications   Medication     amphetamine-dextroamphetamine (ADDERALL) 20 MG tablet     Azelastine HCl 137 MCG/SPRAY SOLN     calcium carbonate (TUMS) 500 MG chewable tablet     cetirizine (ZYRTEC) 10 MG tablet     fluticasone (FLONASE) 50 MCG/ACT  nasal spray     levothyroxine (SYNTHROID/LEVOTHROID) 100 MCG tablet     omeprazole (PRILOSEC) 20 MG DR capsule     oxyCODONE (ROXICODONE) 5 MG tablet     senna-docusate (SENOKOT-S/PERICOLACE) 8.6-50 MG tablet     tamsulosin (FLOMAX) 0.4 MG capsule     No current facility-administered medications for this visit.        PHYSICAL EXAM:    There were no vitals taken for this visit.    Constitutional: Well developed. Conversant and in no acute distress  Eyes: Anicteric sclera, conjunctiva clear, normal extraocular movements  ENT: Normocephalic and atraumatic,   Skin: Warm and dry. No rashes or lesions  Cardiac: No peripheral edema  Back/Flank: Not done  CNS/PNS: Normal musculature and movements, moves all extremities normally  Respiratory: Normal non-labored breathing  Abdomen: Soft nontender and nondistended  Peripheral Vascular: No peripheral edema  Mental Status/Psych: Alert and Oriented x 3. Normal mood and affect    Penis: Not done  Scrotal Skin: Not done  Testicles: Not done  Epididymis: Not done  Digital Rectal Exam:     Cystoscopy: I performed a cystoscopy today and remove the stent without difficulty    Imaging: None    Urinalysis: UA RESULTS:  Recent Labs   Lab Test 07/18/19  0902   COLOR Straw   APPEARANCE Clear   URINEGLC 100*   URINEBILI Negative   URINEKETONE Trace*   SG 1.010   UBLD Negative   URINEPH 8.0*   PROTEIN Negative   NITRITE Negative   LEUKEST Negative   RBCU 5*   WBCU <1       PSA:     Post Void Residual:     Other labs: None today    IMPRESSION:  Doing well, stent removed    PLAN:  His stent was removed in clinic today. He will follow-up as needed in the future.      Lopez Romero M.D.

## 2019-07-24 NOTE — NURSING NOTE
Prior to the start of the procedure and with procedural staff participation, I verbally confirmed the patient s identity using two indicators, relevant allergies, that the procedure was appropriate and matched the consent or emergent situation, and that the correct equipment/implants were available. Immediately prior to starting the procedure I conducted the Time Out with the procedural staff and re-confirmed the patient s name, procedure, and site/side. (The Joint Commission universal protocol was followed.)  Yes    Sedation (Moderate or Deep): None  Pt has signed the consent form stating that we will be doing a CYSTOSCOPY (with or without stent removal) today, and that it is the correct procedure. I verbally confirmed the patient s identity using two indicators, relevant allergies, and that the correct equipment was available. Post-op information given to the pt as needed at check-out. I have sent an appropriate antibiotic to the pharmacy in our building as recommended by the MD. YAMILETH Garay CMA    5mL 2% lidocaine hydrochloride Urojet instilled into urethra.    NDC# [address]-96  Lot #: [id number]  Expiration Date:  12-20  YAMILETH Garay CMA

## (undated) DEVICE — CATH URETERAL FLEX TIP TIGERTAIL 06FRX70CM 139006

## (undated) DEVICE — BASKET NITINOL TIPLESS HALO  1.5FRX120CM 554120

## (undated) DEVICE — PREP TECHNI-CARE CHLOROXYLENOL 3% 4OZ BOTTLE C222-4ZWO

## (undated) DEVICE — BAG CLEAR TRASH 1.3M 39X33" P4040C

## (undated) DEVICE — WIRE GUIDE AMPLATZ SUPER STIFF 0.035"X145CM 46-524

## (undated) DEVICE — SOL NACL 0.9% IRRIG 3000ML BAG 2B7477

## (undated) DEVICE — LINEN HALF SHEET 5512

## (undated) DEVICE — GLOVE PROTEXIS POWDER FREE SMT 7.5  2D72PT75X

## (undated) DEVICE — PACK CYSTO CUSTOM RIDGES

## (undated) DEVICE — LINEN FULL SHEET 5511

## (undated) DEVICE — SOL WATER IRRIG 1000ML BOTTLE 2F7114

## (undated) DEVICE — TUBING IRRIG TUR Y TYPE 96" LF 6543-01

## (undated) RX ORDER — FENTANYL CITRATE 50 UG/ML
INJECTION, SOLUTION INTRAMUSCULAR; INTRAVENOUS
Status: DISPENSED
Start: 2019-07-19

## (undated) RX ORDER — LIDOCAINE HYDROCHLORIDE 10 MG/ML
INJECTION, SOLUTION EPIDURAL; INFILTRATION; INTRACAUDAL; PERINEURAL
Status: DISPENSED
Start: 2019-07-19

## (undated) RX ORDER — ONDANSETRON 2 MG/ML
INJECTION INTRAMUSCULAR; INTRAVENOUS
Status: DISPENSED
Start: 2019-07-19

## (undated) RX ORDER — CEFAZOLIN SODIUM 2 G/100ML
INJECTION, SOLUTION INTRAVENOUS
Status: DISPENSED
Start: 2019-07-19

## (undated) RX ORDER — GLYCOPYRROLATE 0.2 MG/ML
INJECTION INTRAMUSCULAR; INTRAVENOUS
Status: DISPENSED
Start: 2019-07-19

## (undated) RX ORDER — DEXAMETHASONE SODIUM PHOSPHATE 4 MG/ML
INJECTION, SOLUTION INTRA-ARTICULAR; INTRALESIONAL; INTRAMUSCULAR; INTRAVENOUS; SOFT TISSUE
Status: DISPENSED
Start: 2019-07-19

## (undated) RX ORDER — PROPOFOL 10 MG/ML
INJECTION, EMULSION INTRAVENOUS
Status: DISPENSED
Start: 2019-07-19